# Patient Record
Sex: FEMALE | Race: WHITE | NOT HISPANIC OR LATINO | Employment: UNEMPLOYED | URBAN - METROPOLITAN AREA
[De-identification: names, ages, dates, MRNs, and addresses within clinical notes are randomized per-mention and may not be internally consistent; named-entity substitution may affect disease eponyms.]

---

## 2017-12-07 ENCOUNTER — NON-PROVIDER VISIT (OUTPATIENT)
Dept: URGENT CARE | Facility: CLINIC | Age: 27
End: 2017-12-07

## 2017-12-07 DIAGNOSIS — Z11.1 PPD SCREENING TEST: ICD-10-CM

## 2017-12-07 PROCEDURE — 86580 TB INTRADERMAL TEST: CPT | Performed by: FAMILY MEDICINE

## 2017-12-09 ENCOUNTER — NON-PROVIDER VISIT (OUTPATIENT)
Dept: URGENT CARE | Facility: CLINIC | Age: 27
End: 2017-12-09

## 2017-12-09 DIAGNOSIS — Z11.1 ENCOUNTER FOR PPD SKIN TEST READING: ICD-10-CM

## 2017-12-09 LAB — TB WHEAL 3D P 5 TU DIAM: NORMAL MM

## 2017-12-12 ENCOUNTER — EH NON-PROVIDER (OUTPATIENT)
Dept: OCCUPATIONAL MEDICINE | Facility: CLINIC | Age: 27
End: 2017-12-12

## 2017-12-12 ENCOUNTER — EMPLOYEE HEALTH (OUTPATIENT)
Dept: OCCUPATIONAL MEDICINE | Facility: CLINIC | Age: 27
End: 2017-12-12

## 2017-12-12 ENCOUNTER — HOSPITAL ENCOUNTER (OUTPATIENT)
Facility: MEDICAL CENTER | Age: 27
End: 2017-12-12
Attending: PREVENTIVE MEDICINE
Payer: COMMERCIAL

## 2017-12-12 DIAGNOSIS — Z02.1 PRE-EMPLOYMENT DRUG SCREENING: ICD-10-CM

## 2017-12-12 DIAGNOSIS — Z02.1 ENCOUNTER FOR PRE-EMPLOYMENT HEALTH SCREENING EXAMINATION: ICD-10-CM

## 2017-12-12 LAB
AMP AMPHETAMINE: NORMAL
BAR BARBITURATES: NORMAL
BZO BENZODIAZEPINES: NORMAL
COC COCAINE: NORMAL
INT CON NEG: NORMAL
INT CON POS: NORMAL
MDMA ECSTASY: NORMAL
MET METHAMPHETAMINES: NORMAL
MTD METHADONE: NORMAL
OPI OPIATES: NORMAL
OXY OXYCODONE: NORMAL
PCP PHENCYCLIDINE: NORMAL
POC URINE DRUG SCREEN OCDRS: NORMAL
THC: NORMAL

## 2017-12-12 PROCEDURE — 90715 TDAP VACCINE 7 YRS/> IM: CPT | Performed by: PREVENTIVE MEDICINE

## 2017-12-12 PROCEDURE — 80305 DRUG TEST PRSMV DIR OPT OBS: CPT | Performed by: PREVENTIVE MEDICINE

## 2017-12-12 PROCEDURE — 8915 PR COMPREHENSIVE PHYSICAL: Performed by: PREVENTIVE MEDICINE

## 2017-12-12 PROCEDURE — 86480 TB TEST CELL IMMUN MEASURE: CPT | Performed by: PREVENTIVE MEDICINE

## 2017-12-12 PROCEDURE — 86787 VARICELLA-ZOSTER ANTIBODY: CPT | Performed by: PREVENTIVE MEDICINE

## 2017-12-12 PROCEDURE — 90686 IIV4 VACC NO PRSV 0.5 ML IM: CPT | Performed by: PREVENTIVE MEDICINE

## 2017-12-12 PROCEDURE — 94375 RESPIRATORY FLOW VOLUME LOOP: CPT | Performed by: PREVENTIVE MEDICINE

## 2017-12-13 LAB — VZV IGG SER IA-ACNC: POSITIVE

## 2017-12-15 LAB
M TB TUBERC IFN-G BLD QL: NEGATIVE
M TB TUBERC IFN-G/MITOGEN IGNF BLD: -0.01
M TB TUBERC IGNF/MITOGEN IGNF CONTROL: 41.81 [IU]/ML
MITOGEN IGNF BCKGRD COR BLD-ACNC: 0.07 [IU]/ML

## 2018-01-26 ENCOUNTER — HOSPITAL ENCOUNTER (EMERGENCY)
Facility: MEDICAL CENTER | Age: 28
End: 2018-01-27
Attending: EMERGENCY MEDICINE

## 2018-01-26 VITALS
DIASTOLIC BLOOD PRESSURE: 75 MMHG | HEART RATE: 105 BPM | HEIGHT: 65 IN | BODY MASS INDEX: 20.68 KG/M2 | SYSTOLIC BLOOD PRESSURE: 102 MMHG | WEIGHT: 124.12 LBS | RESPIRATION RATE: 18 BRPM | TEMPERATURE: 99 F

## 2018-01-26 DIAGNOSIS — S61.112A LACERATION OF LEFT THUMB WITHOUT FOREIGN BODY WITH DAMAGE TO NAIL, INITIAL ENCOUNTER: ICD-10-CM

## 2018-01-26 PROCEDURE — 99283 EMERGENCY DEPT VISIT LOW MDM: CPT

## 2018-01-27 PROCEDURE — 99283 EMERGENCY DEPT VISIT LOW MDM: CPT

## 2018-01-27 PROCEDURE — 11730 AVULSION NAIL PLATE SIMPLE 1: CPT

## 2018-01-27 PROCEDURE — 303484 HCHG DRESSING LARGE

## 2018-01-27 ASSESSMENT — PAIN SCALES - GENERAL: PAINLEVEL_OUTOF10: 5

## 2018-01-27 NOTE — DISCHARGE INSTRUCTIONS
Fingertip Injuries and Amputations  Fingertip injuries are common and often get injured because they are last to escape when pulling your hand out of harm's way. You have amputated (cut off) part of your finger. How this turns out depends largely on how much was amputated. If just the tip is amputated, often the end of the finger will grow back and the finger may return to much the same as it was before the injury.   If more of the finger is missing, your caregiver has done the best with the tissue remaining to allow you to keep as much finger as is possible. Your caregiver after checking your injury has tried to leave you with a painless fingertip that has durable, feeling skin. If possible, your caregiver has tried to maintain the finger's length and appearance and preserve its fingernail.   Please read the instructions outlined below and refer to this sheet in the next few weeks. These instructions provide you with general information on caring for yourself. Your caregiver may also give you specific instructions. While your treatment has been done according to the most current medical practices available, unavoidable complications occasionally occur. If you have any problems or questions after discharge, please call your caregiver.  HOME CARE INSTRUCTIONS   · You may resume normal diet and activities as directed or allowed.  · Keep your hand elevated above the level of your heart. This helps decrease pain and swelling.  · Keep ice packs (or a bag of ice wrapped in a towel) on the injured area for 15-20 minutes, 3-4 times per day, for the first two days.  · Change dressings if necessary or as directed.  · Clean the wound daily or as directed.  · Only take over-the-counter or prescription medicines for pain, discomfort, or fever as directed by your caregiver.  · Keep appointments as directed.  SEEK IMMEDIATE MEDICAL CARE IF:  · You develop redness, swelling, numbness or increasing pain in the wound.  · There is pus  coming from the wound.  · You develop an unexplained oral temperature above 102° F (38.9° C) or as your caregiver suggests.  · There is a foul (bad) smell coming from the wound or dressing.  · There is a breaking open of the wound (edges not staying together) after sutures or staples have been removed.  MAKE SURE YOU:   · Understand these instructions.  · Will watch your condition.  · Will get help right away if you are not doing well or get worse.  Document Released: 11/08/2006 Document Revised: 03/11/2013 Document Reviewed: 10/07/2009  ExitCare® Patient Information ©2014 [employer].      Laceration Care, Adult  A laceration is a cut that goes through all of the layers of the skin and into the tissue that is right under the skin. Some lacerations heal on their own. Others need to be closed with stitches (sutures), staples, skin adhesive strips, or skin glue. Proper laceration care minimizes the risk of infection and helps the laceration to heal better.  HOW TO CARE FOR YOUR LACERATION  If sutures or staples were used:  · Keep the wound clean and dry.  · If you were given a bandage (dressing), you should change it at least one time per day or as told by your health care provider. You should also change it if it becomes wet or dirty.  · Keep the wound completely dry for the first 24 hours or as told by your health care provider. After that time, you may shower or bathe. However, make sure that the wound is not soaked in water until after the sutures or staples have been removed.  · Clean the wound one time each day or as told by your health care provider:  ¨ Wash the wound with soap and water.  ¨ Rinse the wound with water to remove all soap.  ¨ Pat the wound dry with a clean towel. Do not rub the wound.  · After cleaning the wound, apply a thin layer of antibiotic ointment as told by your health care provider. This will help to prevent infection and keep the dressing from sticking to the wound.  · Have the  sutures or staples removed as told by your health care provider.  If skin adhesive strips were used:  · Keep the wound clean and dry.  · If you were given a bandage (dressing), you should change it at least one time per day or as told by your health care provider. You should also change it if it becomes dirty or wet.  · Do not get the skin adhesive strips wet. You may shower or bathe, but be careful to keep the wound dry.  · If the wound gets wet, pat it dry with a clean towel. Do not rub the wound.  · Skin adhesive strips fall off on their own. You may trim the strips as the wound heals. Do not remove skin adhesive strips that are still stuck to the wound. They will fall off in time.  If skin glue was used:  · Try to keep the wound dry, but you may briefly wet it in the shower or bath. Do not soak the wound in water, such as by swimming.  · After you have showered or bathed, gently pat the wound dry with a clean towel. Do not rub the wound.  · Do not do any activities that will make you sweat heavily until the skin glue has fallen off on its own.  · Do not apply liquid, cream, or ointment medicine to the wound while the skin glue is in place. Using those may loosen the film before the wound has healed.  · If you were given a bandage (dressing), you should change it at least one time per day or as told by your health care provider. You should also change it if it becomes dirty or wet.  · If a dressing is placed over the wound, be careful not to apply tape directly over the skin glue. Doing that may cause the glue to be pulled off before the wound has healed.  · Do not pick at the glue. The skin glue usually remains in place for 5-10 days, then it falls off of the skin.  General Instructions  · Take over-the-counter and prescription medicines only as told by your health care provider.  · If you were prescribed an antibiotic medicine or ointment, take or apply it as told by your doctor. Do not stop using it even if  your condition improves.  · To help prevent scarring, make sure to cover your wound with sunscreen whenever you are outside after stitches are removed, after adhesive strips are removed, or when glue remains in place and the wound is healed. Make sure to wear a sunscreen of at least 30 SPF.  · Do not scratch or pick at the wound.  · Keep all follow-up visits as told by your health care provider. This is important.  · Check your wound every day for signs of infection. Watch for:  ¨ Redness, swelling, or pain.  ¨ Fluid, blood, or pus.  · Raise (elevate) the injured area above the level of your heart while you are sitting or lying down, if possible.  SEEK MEDICAL CARE IF:  · You received a tetanus shot and you have swelling, severe pain, redness, or bleeding at the injection site.  · You have a fever.  · A wound that was closed breaks open.  · You notice a bad smell coming from your wound or your dressing.  · You notice something coming out of the wound, such as wood or glass.  · Your pain is not controlled with medicine.  · You have increased redness, swelling, or pain at the site of your wound.  · You have fluid, blood, or pus coming from your wound.  · You notice a change in the color of your skin near your wound.  · You need to change the dressing frequently due to fluid, blood, or pus draining from the wound.  · You develop a new rash.  · You develop numbness around the wound.  SEEK IMMEDIATE MEDICAL CARE IF:  · You develop severe swelling around the wound.  · Your pain suddenly increases and is severe.  · You develop painful lumps near the wound or on skin that is anywhere on your body.  · You have a red streak going away from your wound.  · The wound is on your hand or foot and you cannot properly move a finger or toe.  · The wound is on your hand or foot and you notice that your fingers or toes look pale or bluish.     This information is not intended to replace advice given to you by your health care provider.  Make sure you discuss any questions you have with your health care provider.     Document Released: 12/18/2006 Document Revised: 05/03/2016 Document Reviewed: 12/14/2015  Elsevier Interactive Patient Education ©2016 [employer]

## 2018-01-27 NOTE — ED NOTES
0015 Wound dressed  0025 D/C instn reviewed Dsg dry and intact post Pt advised to reinforce dsg only Do not remove x 3 d Then leave surgifoam in place and apply  bandaide D/C ambul with family F/U PMD prn Return if S/S infection Stable improved condn

## 2018-01-27 NOTE — ED PROVIDER NOTES
"ED Provider Note    CHIEF COMPLAINT  Chief Complaint   Patient presents with   • T-5000 Lacerations     LT thumb vs knife at 2200 \" won't stop bleeding \"       HPI  Selin Capone is a 27 y.o. female who presents to the emergency department complaining of finger laceration. Patient says that she was cutting go all the the knife cut the tip of her left thumb. She is right-hand dominant. Tetanus is up-to-date within the last few months. No significant past medical history. Injury happened within the last hour. No other injuries or complaints    REVIEW OF SYSTEMS  See HPI for further details. All other systems are negative.     PAST MEDICAL HISTORY       SOCIAL HISTORY  Social History     Social History Main Topics   • Smoking status: Never Smoker   • Smokeless tobacco: Never Used   • Alcohol use Yes      Comment: occass   • Drug use: No   • Sexual activity: Not on file       SURGICAL HISTORY  patient denies any surgical history    CURRENT MEDICATIONS  Home Medications     Reviewed by Brionna Carrion R.N. (Registered Nurse) on 01/27/18 at 0007  Med List Status: Complete   Medication Last Dose Status        Patient Keanu Taking any Medications                       ALLERGIES  No Known Allergies    PHYSICAL EXAM  VITAL SIGNS: /75   Pulse (!) 105   Temp 37.2 °C (99 °F)   Resp 18   Ht 1.651 m (5' 5\")   Wt 56.3 kg (124 lb 1.9 oz)   BMI 20.65 kg/m²  @[id number]::@  Pulse ox interpretation: I interpret this pulse ox as normal.  Constitutional: Alert in no apparent distress.  HENT: Normocephalic, Atraumatic, Bilateral external ears normal. Nose normal.   Eyes: Pupils are equal and reactive. Conjunctiva normal, non-icteric.   Heart: Regular rate and rythm, no murmurs.    Lungs: Clear to auscultation bilaterally.    Skin: Warm, Dry, No erythema, No rash.  Left thumb: Distal lateral/radial aspect of distal thumb and nailbed with avulsion laceration measuring approximately 1 cm 0.5 cm avulsion. Minimal active venous " bleeding. No suturable wounds to nail bed. Distal neurovascular intact. Full range motion of all joints.    Neurologic: Alert, Grossly non-focal.   Psychiatric: Affect normal, Judgment normal, Mood normal, Appears appropriate and not intoxicated.       Procedure note: Quick clot foam and tube gauze dressing placed with good hemostasis. Patient tolerated well.    COURSE & MEDICAL DECISION MAKING  Pertinent Labs & Imaging studies reviewed. (See chart for details)  History presenting to the return of her avulsion laceration of distal left thumb. History and physical exam as above. Wound management as noted in procedure note. Patient discharged home with outpatient wound care instructions and additional wound care supplies for redressing as needed. She is understanding return precautions if needed.     The patient will return for worsening symptoms and is stable at the time of discharge. The patient verbalizes understanding and will comply.    FINAL IMPRESSION  1. Laceration of left thumb without foreign body with damage to nail, initial encounter               Electronically signed by: Luiz Sheikh, 1/27/2018 12:16 AM

## 2019-03-19 ENCOUNTER — HOSPITAL ENCOUNTER (OUTPATIENT)
Dept: LAB | Facility: MEDICAL CENTER | Age: 29
End: 2019-03-19
Attending: OBSTETRICS & GYNECOLOGY
Payer: COMMERCIAL

## 2019-03-19 PROCEDURE — 87591 N.GONORRHOEAE DNA AMP PROB: CPT

## 2019-03-19 PROCEDURE — 88175 CYTOPATH C/V AUTO FLUID REDO: CPT

## 2019-03-19 PROCEDURE — 87491 CHLMYD TRACH DNA AMP PROBE: CPT

## 2019-03-22 LAB
C TRACH DNA GENITAL QL NAA+PROBE: NEGATIVE
CYTOLOGY REG CYTOL: NORMAL
N GONORRHOEA DNA GENITAL QL NAA+PROBE: NEGATIVE
SPECIMEN SOURCE: NORMAL

## 2019-04-29 ENCOUNTER — HOSPITAL ENCOUNTER (OUTPATIENT)
Dept: LAB | Facility: MEDICAL CENTER | Age: 29
End: 2019-04-29
Attending: OBSTETRICS & GYNECOLOGY
Payer: COMMERCIAL

## 2019-04-29 LAB
ABO GROUP BLD: NORMAL
BASOPHILS # BLD AUTO: 0.6 % (ref 0–1.8)
BASOPHILS # BLD: 0.08 K/UL (ref 0–0.12)
BLD GP AB SCN SERPL QL: NORMAL
EOSINOPHIL # BLD AUTO: 0.19 K/UL (ref 0–0.51)
EOSINOPHIL NFR BLD: 1.3 % (ref 0–6.9)
ERYTHROCYTE [DISTWIDTH] IN BLOOD BY AUTOMATED COUNT: 43.8 FL (ref 35.9–50)
HCT VFR BLD AUTO: 45.9 % (ref 37–47)
HGB BLD-MCNC: 15.2 G/DL (ref 12–16)
IMM GRANULOCYTES # BLD AUTO: 0.06 K/UL (ref 0–0.11)
IMM GRANULOCYTES NFR BLD AUTO: 0.4 % (ref 0–0.9)
LYMPHOCYTES # BLD AUTO: 2.02 K/UL (ref 1–4.8)
LYMPHOCYTES NFR BLD: 14.3 % (ref 22–41)
MCH RBC QN AUTO: 30.9 PG (ref 27–33)
MCHC RBC AUTO-ENTMCNC: 33.1 G/DL (ref 33.6–35)
MCV RBC AUTO: 93.3 FL (ref 81.4–97.8)
MONOCYTES # BLD AUTO: 0.98 K/UL (ref 0–0.85)
MONOCYTES NFR BLD AUTO: 6.9 % (ref 0–13.4)
NEUTROPHILS # BLD AUTO: 10.84 K/UL (ref 2–7.15)
NEUTROPHILS NFR BLD: 76.5 % (ref 44–72)
NRBC # BLD AUTO: 0 K/UL
NRBC BLD-RTO: 0 /100 WBC
PLATELET # BLD AUTO: 204 K/UL (ref 164–446)
PMV BLD AUTO: 11.5 FL (ref 9–12.9)
RBC # BLD AUTO: 4.92 M/UL (ref 4.2–5.4)
RH BLD: NORMAL
WBC # BLD AUTO: 14.2 K/UL (ref 4.8–10.8)

## 2019-04-29 PROCEDURE — 86900 BLOOD TYPING SEROLOGIC ABO: CPT

## 2019-04-29 PROCEDURE — 87086 URINE CULTURE/COLONY COUNT: CPT

## 2019-04-29 PROCEDURE — 86901 BLOOD TYPING SEROLOGIC RH(D): CPT

## 2019-04-29 PROCEDURE — 86803 HEPATITIS C AB TEST: CPT

## 2019-04-29 PROCEDURE — 36415 COLL VENOUS BLD VENIPUNCTURE: CPT

## 2019-04-29 PROCEDURE — 86780 TREPONEMA PALLIDUM: CPT

## 2019-04-29 PROCEDURE — 87389 HIV-1 AG W/HIV-1&-2 AB AG IA: CPT

## 2019-04-29 PROCEDURE — 87340 HEPATITIS B SURFACE AG IA: CPT

## 2019-04-29 PROCEDURE — 81511 FTL CGEN ABNOR FOUR ANAL: CPT

## 2019-04-29 PROCEDURE — 86762 RUBELLA ANTIBODY: CPT

## 2019-04-29 PROCEDURE — 85025 COMPLETE CBC W/AUTO DIFF WBC: CPT

## 2019-04-29 PROCEDURE — 86850 RBC ANTIBODY SCREEN: CPT

## 2019-04-30 LAB
HBV SURFACE AG SER QL: NEGATIVE
HCV AB SER QL: NEGATIVE
HIV 1+2 AB+HIV1 P24 AG SERPL QL IA: NON REACTIVE
RUBV AB SER QL: 32.4 IU/ML
TREPONEMA PALLIDUM IGG+IGM AB [PRESENCE] IN SERUM OR PLASMA BY IMMUNOASSAY: NON REACTIVE

## 2019-05-01 LAB
BACTERIA UR CULT: NORMAL
SIGNIFICANT IND 70042: NORMAL
SITE SITE: NORMAL
SOURCE SOURCE: NORMAL

## 2019-05-02 LAB
# FETUSES US: NORMAL
AFP MOM SERPL: 0.66
AFP SERPL-MCNC: 20 NG/ML
AGE - REPORTED: 29.4 YR
CURRENT SMOKER: NO
FAMILY MEMBER DISEASES HX: NO
GA METHOD: NORMAL
GA: NORMAL WK
HCG MOM SERPL: 1.46
HCG SERPL-ACNC: NORMAL IU/L
HX OF HEREDITARY DISORDERS: NO
IDDM PATIENT QL: NO
INHIBIN A MOM SERPL: 1.09
INHIBIN A SERPL-MCNC: 210 PG/ML
INTEGRATED SCN PATIENT-IMP: NORMAL
PATHOLOGY STUDY: NORMAL
SPECIMEN DRAWN SERPL: NORMAL
U ESTRIOL MOM SERPL: 0.99
U ESTRIOL SERPL-MCNC: 0.69 NG/ML

## 2019-07-10 ENCOUNTER — HOSPITAL ENCOUNTER (OUTPATIENT)
Dept: LAB | Facility: MEDICAL CENTER | Age: 29
End: 2019-07-10
Attending: OBSTETRICS & GYNECOLOGY
Payer: COMMERCIAL

## 2019-07-10 LAB
GLUCOSE 1H P 50 G GLC PO SERPL-MCNC: 164 MG/DL (ref 70–139)
HCT VFR BLD AUTO: 39.6 % (ref 37–47)
HGB BLD-MCNC: 13.5 G/DL (ref 12–16)
PLATELET # BLD AUTO: 186 K/UL (ref 164–446)
TREPONEMA PALLIDUM IGG+IGM AB [PRESENCE] IN SERUM OR PLASMA BY IMMUNOASSAY: NON REACTIVE

## 2019-07-10 PROCEDURE — 85014 HEMATOCRIT: CPT

## 2019-07-10 PROCEDURE — 36415 COLL VENOUS BLD VENIPUNCTURE: CPT

## 2019-07-10 PROCEDURE — 85018 HEMOGLOBIN: CPT

## 2019-07-10 PROCEDURE — 82950 GLUCOSE TEST: CPT

## 2019-07-10 PROCEDURE — 86780 TREPONEMA PALLIDUM: CPT

## 2019-07-10 PROCEDURE — 85049 AUTOMATED PLATELET COUNT: CPT

## 2019-07-25 ENCOUNTER — HOSPITAL ENCOUNTER (OUTPATIENT)
Dept: LAB | Facility: MEDICAL CENTER | Age: 29
End: 2019-07-25
Attending: OBSTETRICS & GYNECOLOGY
Payer: COMMERCIAL

## 2019-07-25 LAB
GLUCOSE 1H P CHAL SERPL-MCNC: 213 MG/DL (ref 65–180)
GLUCOSE 2H P CHAL SERPL-MCNC: 151 MG/DL (ref 65–155)
GLUCOSE 3H P CHAL SERPL-MCNC: 120 MG/DL (ref 65–140)
GLUCOSE BS SERPL-MCNC: 105 MG/DL (ref 65–95)

## 2019-07-25 PROCEDURE — 82952 GTT-ADDED SAMPLES: CPT

## 2019-07-25 PROCEDURE — 82951 GLUCOSE TOLERANCE TEST (GTT): CPT

## 2019-07-25 PROCEDURE — 36415 COLL VENOUS BLD VENIPUNCTURE: CPT

## 2019-08-07 ENCOUNTER — HOSPITAL ENCOUNTER (OUTPATIENT)
Dept: LAB | Facility: MEDICAL CENTER | Age: 29
End: 2019-08-07
Attending: OBSTETRICS & GYNECOLOGY
Payer: COMMERCIAL

## 2019-08-07 ENCOUNTER — OFFICE VISIT (OUTPATIENT)
Dept: URGENT CARE | Facility: CLINIC | Age: 29
End: 2019-08-07
Payer: COMMERCIAL

## 2019-08-07 VITALS
WEIGHT: 161.2 LBS | OXYGEN SATURATION: 98 % | BODY MASS INDEX: 26.86 KG/M2 | HEART RATE: 124 BPM | HEIGHT: 65 IN | DIASTOLIC BLOOD PRESSURE: 74 MMHG | RESPIRATION RATE: 12 BRPM | TEMPERATURE: 98.9 F | SYSTOLIC BLOOD PRESSURE: 132 MMHG

## 2019-08-07 DIAGNOSIS — R42 LIGHTHEADEDNESS: ICD-10-CM

## 2019-08-07 DIAGNOSIS — Z71.1 WORRIED WELL: ICD-10-CM

## 2019-08-07 DIAGNOSIS — R06.02 SOB (SHORTNESS OF BREATH): ICD-10-CM

## 2019-08-07 DIAGNOSIS — Z3A.29 29 WEEKS GESTATION OF PREGNANCY: ICD-10-CM

## 2019-08-07 LAB
GLUCOSE 1H P CHAL SERPL-MCNC: 220 MG/DL (ref 65–180)
GLUCOSE 2H P CHAL SERPL-MCNC: 172 MG/DL (ref 65–155)
GLUCOSE 3H P CHAL SERPL-MCNC: 139 MG/DL (ref 65–140)
GLUCOSE BS SERPL-MCNC: 114 MG/DL (ref 65–95)

## 2019-08-07 PROCEDURE — 36415 COLL VENOUS BLD VENIPUNCTURE: CPT

## 2019-08-07 PROCEDURE — 82952 GTT-ADDED SAMPLES: CPT

## 2019-08-07 PROCEDURE — 82951 GLUCOSE TOLERANCE TEST (GTT): CPT

## 2019-08-07 PROCEDURE — 99203 OFFICE O/P NEW LOW 30 MIN: CPT | Performed by: NURSE PRACTITIONER

## 2019-08-08 NOTE — PROGRESS NOTES
"Subjective:      Narendra Capone is a 29 y.o. female who presents with Lightheadedness (x 1 day.  Pt. is feeling lightheaded, S.O.B., and palpitations. Pt. is 29 weeks pregnant.)            HPI  Lightheaded, SOB, heart palpitations today. 29 weeks pregnant. Was seen by OB earlier today and states baby is doing well. Did not mention these symptoms to her OB. States these symptoms will come and go throughout her pregnancy. Denies fever, recent illness, n/v/d. Denies CP/pressure. Denies dysuria.    PMH:  has no past medical history on file.  MEDS: No current outpatient medications on file.  ALLERGIES: No Known Allergies  SURGHX: No past surgical history on file.  SOCHX:  reports that she has never smoked. She has never used smokeless tobacco. She reports that she drinks alcohol. She reports that she does not use drugs.  FH: Family history was reviewed, no pertinent findings to report    Review of Systems   Constitutional: Negative for chills, fever and malaise/fatigue.   HENT: Negative for congestion, ear discharge, ear pain, sinus pain and sore throat.    Eyes: Negative for blurred vision, double vision and photophobia.   Respiratory: Positive for shortness of breath. Negative for cough.    Cardiovascular: Positive for palpitations. Negative for chest pain, orthopnea and leg swelling.   Gastrointestinal: Negative for abdominal pain, diarrhea, nausea and vomiting.   Genitourinary: Negative for dysuria, flank pain, frequency, hematuria and urgency.   Musculoskeletal: Negative for myalgias.   Skin: Negative for itching and rash.   Neurological: Positive for dizziness. Negative for tingling, sensory change, focal weakness, loss of consciousness, weakness and headaches.   Psychiatric/Behavioral: The patient is not nervous/anxious.    All other systems reviewed and are negative.         Objective:     /58   Pulse (!) 125   Temp 37.2 °C (98.9 °F) (Temporal)   Resp 12   Ht 1.651 m (5' 5\")   Wt 73.1 kg (161 lb 3.2 " oz)   LMP 01/13/2019   SpO2 98%   BMI 26.83 kg/m²      Physical Exam   Constitutional: She is oriented to person, place, and time. Vital signs are normal. She appears well-developed and well-nourished. She is active and cooperative.  Non-toxic appearance. She does not have a sickly appearance. She does not appear ill. No distress.   HENT:   Head: Normocephalic.   Eyes: Pupils are equal, round, and reactive to light. Conjunctivae and EOM are normal.   Neck: Normal range of motion. Neck supple.   Cardiovascular: Normal rate, regular rhythm, S1 normal, S2 normal and normal heart sounds. Exam reveals no gallop and no friction rub.   No murmur heard.  Pulmonary/Chest: Effort normal and breath sounds normal. No accessory muscle usage or stridor. No tachypnea. No respiratory distress. She has no decreased breath sounds. She has no wheezes. She has no rhonchi. She has no rales.   Abdominal: Soft. Bowel sounds are normal.   Musculoskeletal: Normal range of motion.   Neurological: She is alert and oriented to person, place, and time. She has normal strength. No cranial nerve deficit or sensory deficit. Coordination and gait normal. GCS eye subscore is 4. GCS verbal subscore is 5. GCS motor subscore is 6.   Skin: Skin is warm and dry. She is not diaphoretic.   Psychiatric: She has a normal mood and affect. Her speech is normal and behavior is normal. Judgment and thought content normal. She is not actively hallucinating. Cognition and memory are normal. She is attentive.   Vitals reviewed.              Assessment/Plan:     1. SOB (shortness of breath)      2. 29 weeks gestation of pregnancy      3. Lightheadedness    - EKG - Clinic Performed: , tachycardia  - Orthostatic Blood Pressure; NEG    4. Worried well    Discussed normal raise in HR in 3rd trimester, can experience heart palpitations, lightheadedness as well. Make sure to drink and eat frequently.  If episodes continue or worsen, [facility] or [facility], patient understands this.

## 2019-08-09 ASSESSMENT — ENCOUNTER SYMPTOMS
SINUS PAIN: 0
FOCAL WEAKNESS: 0
CHILLS: 0
NERVOUS/ANXIOUS: 0
WEAKNESS: 0
PHOTOPHOBIA: 0
COUGH: 0
FLANK PAIN: 0
SHORTNESS OF BREATH: 1
NAUSEA: 0
LOSS OF CONSCIOUSNESS: 0
DOUBLE VISION: 0
SORE THROAT: 0
VOMITING: 0
HEADACHES: 0
BLURRED VISION: 0
MYALGIAS: 0
ABDOMINAL PAIN: 0
SENSORY CHANGE: 0
PALPITATIONS: 1
TINGLING: 0
DIZZINESS: 1
FEVER: 0
DIARRHEA: 0
ORTHOPNEA: 0

## 2019-08-16 ENCOUNTER — NON-PROVIDER VISIT (OUTPATIENT)
Dept: HEALTH INFORMATION MANAGEMENT | Facility: MEDICAL CENTER | Age: 29
End: 2019-08-16
Payer: COMMERCIAL

## 2019-08-16 VITALS — HEIGHT: 65 IN | BODY MASS INDEX: 26.69 KG/M2 | WEIGHT: 160.2 LBS

## 2019-08-16 DIAGNOSIS — O24.419 GESTATIONAL DIABETES MELLITUS (GDM) IN THIRD TRIMESTER, GESTATIONAL DIABETES METHOD OF CONTROL UNSPECIFIED: ICD-10-CM

## 2019-08-16 PROCEDURE — G0109 DIAB MANAGE TRN IND/GROUP: HCPCS | Performed by: INTERNAL MEDICINE

## 2019-08-16 NOTE — LETTER
August 16, 2019                   Re: Narendra Capone     1990         [id number]       Joel Poole M.D.  [address]  [address]  [place] [address]      Dear :Joel Poole M.D.    On 8/16/2019, your patient Narendra Capone, received 2 hours of nutrition [facility] her [facility] diabetes.  Her EDC is Estimated Date of Delivery: 10/20/19.    A 1,600 kcal vegetarian meal plan was written for her to follow and she agrees to eat at the agreed upon times.    Patient/caregiver appeared to understand the content as demonstrated by appropriate questions.     Narendra Capone was encouraged to discuss this further with you.    Hopefully this will help in your management of her care.  If we can be of further assistance, please feel free to call.    Thank you for the referral.    Sincerely,    GDM CLASS  Certified Diabetes Educator

## 2019-08-16 NOTE — PROGRESS NOTES
Narendra came to the Gestational Diabetes program.  She states her father has Type 2 Diabetes.  She denies any problems with this pregnancy.  She brought in an Accuchek Leah meter.  She was able to do a return demonstration without difficulty. She will keep walking and stay well hydrated with water. Her meal plan is scanned into Media and her Doctor Letters with what was taught can be found under the Letters tab.

## 2019-08-16 NOTE — LETTER
August 16, 2019                   Re: Narendra Capone     1990         [id number]       Joel Poole M.D.  [address]  [address]  [place] [address]      Dear :Joel Poole M.D.    On 8/16/2019, your patient Narendra Capone, received 1 hour of nurse training from the [facility] at [facility] for management of her gestational diabetes.  Her  Estimated Date of Delivery: 10/20/19.  We taught the following subjects:    Introduction to gestational diabetes, benefits and responsibilities of patient, physiology of diabetes and the diease process, benefits of blood glucose monitoring and record keeping, medication action and possible side effects, hypoglycemia, sick day management, exercise, stress reduction and travel with diabetes.       Nurse assessment / Education:    Comments:    Edema:no      Weight: 160.2 lb         Complaints:no      Pathophysiology of diabetes in pregnancy    Discuss  potential maternal and fetal complications in pregnancy with diabetes.     Importance of blood glucose monitoring   Proper testing technique using a One Touch Ultra 2 meter.    At 2 pm, the meter read 143, which was 1.25 hours after eating.  Testing: fasting and one hour after meals,  expected ranges and rationale for strict control.     Ketone test today:no       Recognition and treatment of hypoglycemia.     Insulin taught: No  Insulin briefly dicussed at this time.    Should patient require insulin later in pregnancy, she would need further education.     Reviewed fetal kick counts and other tests to determine fetal well-being  Discuss benefits and risks of exercise in pregnancy  Discuss when to call Doctor  Discuss sick day care  Importance of wearing diabetes identification      Patient/caregiver appeared to understand the content as demonstrated by appropriate questions.     Narendra Capone was encouraged to discuss this further with you.    Hopefully this will help in your management of her care.  If we can  be of further assistance, please feel free to call.    Thank you for the referral.    Sincerely,      Sadie Zavala RN CDEGDM CLASS  Certified Diabetes Educator

## 2019-08-16 NOTE — PROGRESS NOTES
A 1,600 kcal vegetarian meal plan was written for Narendra today.  She agrees to follow the meal plan and eat at the agreed upon meal times.  She will contact us with questions.

## 2019-09-10 ENCOUNTER — HOSPITAL ENCOUNTER (OUTPATIENT)
Dept: LAB | Facility: MEDICAL CENTER | Age: 29
End: 2019-09-10
Attending: OBSTETRICS & GYNECOLOGY
Payer: COMMERCIAL

## 2019-09-10 PROCEDURE — 87150 DNA/RNA AMPLIFIED PROBE: CPT

## 2019-09-10 PROCEDURE — 87081 CULTURE SCREEN ONLY: CPT

## 2019-09-11 LAB — GP B STREP DNA SPEC QL NAA+PROBE: POSITIVE

## 2019-10-12 ENCOUNTER — ANESTHESIA (OUTPATIENT)
Dept: OBGYN | Facility: MEDICAL CENTER | Age: 29
End: 2019-10-12
Payer: COMMERCIAL

## 2019-10-12 ENCOUNTER — HOSPITAL ENCOUNTER (INPATIENT)
Facility: MEDICAL CENTER | Age: 29
LOS: 3 days | End: 2019-10-15
Attending: OBSTETRICS & GYNECOLOGY | Admitting: OBSTETRICS & GYNECOLOGY
Payer: COMMERCIAL

## 2019-10-12 ENCOUNTER — ANESTHESIA EVENT (OUTPATIENT)
Dept: OBGYN | Facility: MEDICAL CENTER | Age: 29
End: 2019-10-12
Payer: COMMERCIAL

## 2019-10-12 DIAGNOSIS — G89.18 POSTOPERATIVE PAIN: ICD-10-CM

## 2019-10-12 PROBLEM — O24.415 ORAL HYPOGLYCEMIC CONTROLLED WHITE CLASSIFICATION A2 GESTATIONAL DIABETES MELLITUS (GDM): Status: ACTIVE | Noted: 2019-10-12

## 2019-10-12 LAB
BASOPHILS # BLD AUTO: 0.4 % (ref 0–1.8)
BASOPHILS # BLD: 0.05 K/UL (ref 0–0.12)
EOSINOPHIL # BLD AUTO: 0.1 K/UL (ref 0–0.51)
EOSINOPHIL NFR BLD: 0.8 % (ref 0–6.9)
ERYTHROCYTE [DISTWIDTH] IN BLOOD BY AUTOMATED COUNT: 46 FL (ref 35.9–50)
ERYTHROCYTE [DISTWIDTH] IN BLOOD BY AUTOMATED COUNT: 46.8 FL (ref 35.9–50)
GLUCOSE BLD-MCNC: 84 MG/DL (ref 65–99)
HCT VFR BLD AUTO: 36.5 % (ref 37–47)
HCT VFR BLD AUTO: 38.5 % (ref 37–47)
HGB BLD-MCNC: 11.9 G/DL (ref 12–16)
HGB BLD-MCNC: 12.6 G/DL (ref 12–16)
HOLDING TUBE BB 8507: NORMAL
IMM GRANULOCYTES # BLD AUTO: 0.07 K/UL (ref 0–0.11)
IMM GRANULOCYTES NFR BLD AUTO: 0.6 % (ref 0–0.9)
LYMPHOCYTES # BLD AUTO: 2.61 K/UL (ref 1–4.8)
LYMPHOCYTES NFR BLD: 20.6 % (ref 22–41)
MCH RBC QN AUTO: 28.8 PG (ref 27–33)
MCH RBC QN AUTO: 29.4 PG (ref 27–33)
MCHC RBC AUTO-ENTMCNC: 32.6 G/DL (ref 33.6–35)
MCHC RBC AUTO-ENTMCNC: 32.7 G/DL (ref 33.6–35)
MCV RBC AUTO: 88.1 FL (ref 81.4–97.8)
MCV RBC AUTO: 90.1 FL (ref 81.4–97.8)
MONOCYTES # BLD AUTO: 1.22 K/UL (ref 0–0.85)
MONOCYTES NFR BLD AUTO: 9.6 % (ref 0–13.4)
NEUTROPHILS # BLD AUTO: 8.61 K/UL (ref 2–7.15)
NEUTROPHILS NFR BLD: 68 % (ref 44–72)
NRBC # BLD AUTO: 0 K/UL
NRBC BLD-RTO: 0 /100 WBC
PLATELET # BLD AUTO: 180 K/UL (ref 164–446)
PLATELET # BLD AUTO: 187 K/UL (ref 164–446)
PMV BLD AUTO: 10.2 FL (ref 9–12.9)
PMV BLD AUTO: 10.3 FL (ref 9–12.9)
RBC # BLD AUTO: 4.05 M/UL (ref 4.2–5.4)
RBC # BLD AUTO: 4.37 M/UL (ref 4.2–5.4)
WBC # BLD AUTO: 12.7 K/UL (ref 4.8–10.8)
WBC # BLD AUTO: 14.7 K/UL (ref 4.8–10.8)

## 2019-10-12 PROCEDURE — 700105 HCHG RX REV CODE 258: Performed by: ANESTHESIOLOGY

## 2019-10-12 PROCEDURE — A9270 NON-COVERED ITEM OR SERVICE: HCPCS | Performed by: ANESTHESIOLOGY

## 2019-10-12 PROCEDURE — 306828 HCHG ANES-TIME GENERAL: Performed by: OBSTETRICS & GYNECOLOGY

## 2019-10-12 PROCEDURE — 700111 HCHG RX REV CODE 636 W/ 250 OVERRIDE (IP): Performed by: ANESTHESIOLOGY

## 2019-10-12 PROCEDURE — 304966 HCHG RECOVERY SVSC TIME ADDL 1/2 HR: Performed by: OBSTETRICS & GYNECOLOGY

## 2019-10-12 PROCEDURE — 700105 HCHG RX REV CODE 258: Performed by: OBSTETRICS & GYNECOLOGY

## 2019-10-12 PROCEDURE — 36415 COLL VENOUS BLD VENIPUNCTURE: CPT

## 2019-10-12 PROCEDURE — A6212 FOAM DRG <=16 SQ IN W/BORDER: HCPCS

## 2019-10-12 PROCEDURE — 700111 HCHG RX REV CODE 636 W/ 250 OVERRIDE (IP): Performed by: OBSTETRICS & GYNECOLOGY

## 2019-10-12 PROCEDURE — 700101 HCHG RX REV CODE 250: Performed by: ANESTHESIOLOGY

## 2019-10-12 PROCEDURE — 85027 COMPLETE CBC AUTOMATED: CPT

## 2019-10-12 PROCEDURE — 304964 HCHG RECOVERY ROOM TIME 1HR: Performed by: OBSTETRICS & GYNECOLOGY

## 2019-10-12 PROCEDURE — 306288 HCHG RETRACTOR C SECTION LG

## 2019-10-12 PROCEDURE — 85025 COMPLETE CBC W/AUTO DIFF WBC: CPT

## 2019-10-12 PROCEDURE — 82962 GLUCOSE BLOOD TEST: CPT

## 2019-10-12 PROCEDURE — 59514 CESAREAN DELIVERY ONLY: CPT | Mod: 80 | Performed by: OBSTETRICS & GYNECOLOGY

## 2019-10-12 PROCEDURE — 700102 HCHG RX REV CODE 250 W/ 637 OVERRIDE(OP): Performed by: ANESTHESIOLOGY

## 2019-10-12 PROCEDURE — A9270 NON-COVERED ITEM OR SERVICE: HCPCS | Performed by: OBSTETRICS & GYNECOLOGY

## 2019-10-12 PROCEDURE — 59514 CESAREAN DELIVERY ONLY: CPT

## 2019-10-12 PROCEDURE — 770002 HCHG ROOM/CARE - OB PRIVATE (112)

## 2019-10-12 PROCEDURE — 700111 HCHG RX REV CODE 636 W/ 250 OVERRIDE (IP)

## 2019-10-12 PROCEDURE — 3E0P7VZ INTRODUCTION OF HORMONE INTO FEMALE REPRODUCTIVE, VIA NATURAL OR ARTIFICIAL OPENING: ICD-10-PCS | Performed by: OBSTETRICS & GYNECOLOGY

## 2019-10-12 PROCEDURE — 700102 HCHG RX REV CODE 250 W/ 637 OVERRIDE(OP): Performed by: OBSTETRICS & GYNECOLOGY

## 2019-10-12 PROCEDURE — 4A1HXCZ MONITORING OF PRODUCTS OF CONCEPTION, CARDIAC RATE, EXTERNAL APPROACH: ICD-10-PCS | Performed by: OBSTETRICS & GYNECOLOGY

## 2019-10-12 PROCEDURE — 305385 HCHG SURGICAL SERVICES 1/4 HOUR: Performed by: OBSTETRICS & GYNECOLOGY

## 2019-10-12 RX ORDER — DIPHENHYDRAMINE HYDROCHLORIDE 50 MG/ML
12.5 INJECTION INTRAMUSCULAR; INTRAVENOUS EVERY 6 HOURS PRN
Status: DISCONTINUED | OUTPATIENT
Start: 2019-10-12 | End: 2019-10-13

## 2019-10-12 RX ORDER — SODIUM CHLORIDE, SODIUM LACTATE, POTASSIUM CHLORIDE, CALCIUM CHLORIDE 600; 310; 30; 20 MG/100ML; MG/100ML; MG/100ML; MG/100ML
INJECTION, SOLUTION INTRAVENOUS CONTINUOUS
Status: DISPENSED | OUTPATIENT
Start: 2019-10-12 | End: 2019-10-12

## 2019-10-12 RX ORDER — ONDANSETRON 2 MG/ML
4 INJECTION INTRAMUSCULAR; INTRAVENOUS
Status: CANCELLED | OUTPATIENT
Start: 2019-10-12

## 2019-10-12 RX ORDER — MISOPROSTOL 200 UG/1
800 TABLET ORAL
Status: DISCONTINUED | OUTPATIENT
Start: 2019-10-12 | End: 2019-10-12 | Stop reason: HOSPADM

## 2019-10-12 RX ORDER — ACETAMINOPHEN 500 MG
1000 TABLET ORAL EVERY 6 HOURS
Status: COMPLETED | OUTPATIENT
Start: 2019-10-12 | End: 2019-10-13

## 2019-10-12 RX ORDER — MEPERIDINE HYDROCHLORIDE 25 MG/ML
12.5 INJECTION INTRAMUSCULAR; INTRAVENOUS; SUBCUTANEOUS
Status: CANCELLED | OUTPATIENT
Start: 2019-10-12

## 2019-10-12 RX ORDER — OXYCODONE HYDROCHLORIDE 10 MG/1
10 TABLET ORAL EVERY 4 HOURS PRN
Status: DISCONTINUED | OUTPATIENT
Start: 2019-10-12 | End: 2019-10-13

## 2019-10-12 RX ORDER — HALOPERIDOL 5 MG/ML
1 INJECTION INTRAMUSCULAR
Status: CANCELLED | OUTPATIENT
Start: 2019-10-12

## 2019-10-12 RX ORDER — MORPHINE SULFATE 0.5 MG/ML
INJECTION, SOLUTION EPIDURAL; INTRATHECAL; INTRAVENOUS PRN
Status: DISCONTINUED | OUTPATIENT
Start: 2019-10-12 | End: 2019-10-12 | Stop reason: SURG

## 2019-10-12 RX ORDER — HYDROMORPHONE HYDROCHLORIDE 1 MG/ML
0.1 INJECTION, SOLUTION INTRAMUSCULAR; INTRAVENOUS; SUBCUTANEOUS
Status: CANCELLED | OUTPATIENT
Start: 2019-10-12

## 2019-10-12 RX ORDER — IBUPROFEN 600 MG/1
600 TABLET ORAL EVERY 6 HOURS PRN
Status: CANCELLED | OUTPATIENT
Start: 2019-10-12

## 2019-10-12 RX ORDER — DOCUSATE SODIUM 100 MG/1
100 CAPSULE, LIQUID FILLED ORAL 2 TIMES DAILY PRN
Status: DISCONTINUED | OUTPATIENT
Start: 2019-10-12 | End: 2019-10-15 | Stop reason: HOSPADM

## 2019-10-12 RX ORDER — ALUMINA, MAGNESIA, AND SIMETHICONE 2400; 2400; 240 MG/30ML; MG/30ML; MG/30ML
30 SUSPENSION ORAL EVERY 6 HOURS PRN
Status: DISCONTINUED | OUTPATIENT
Start: 2019-10-12 | End: 2019-10-12 | Stop reason: HOSPADM

## 2019-10-12 RX ORDER — CITRIC ACID/SODIUM CITRATE 334-500MG
30 SOLUTION, ORAL ORAL ONCE
Status: COMPLETED | OUTPATIENT
Start: 2019-10-12 | End: 2019-10-12

## 2019-10-12 RX ORDER — DIPHENHYDRAMINE HYDROCHLORIDE 50 MG/ML
25 INJECTION INTRAMUSCULAR; INTRAVENOUS EVERY 6 HOURS PRN
Status: DISCONTINUED | OUTPATIENT
Start: 2019-10-12 | End: 2019-10-13

## 2019-10-12 RX ORDER — HYDROMORPHONE HYDROCHLORIDE 1 MG/ML
0.2 INJECTION, SOLUTION INTRAMUSCULAR; INTRAVENOUS; SUBCUTANEOUS
Status: CANCELLED | OUTPATIENT
Start: 2019-10-12

## 2019-10-12 RX ORDER — ONDANSETRON 2 MG/ML
4 INJECTION INTRAMUSCULAR; INTRAVENOUS EVERY 6 HOURS PRN
Status: DISCONTINUED | OUTPATIENT
Start: 2019-10-12 | End: 2019-10-13

## 2019-10-12 RX ORDER — SIMETHICONE 80 MG
80 TABLET,CHEWABLE ORAL 4 TIMES DAILY PRN
Status: DISCONTINUED | OUTPATIENT
Start: 2019-10-12 | End: 2019-10-15 | Stop reason: HOSPADM

## 2019-10-12 RX ORDER — OXYCODONE HYDROCHLORIDE AND ACETAMINOPHEN 5; 325 MG/1; MG/1
1 TABLET ORAL
Status: CANCELLED | OUTPATIENT
Start: 2019-10-12

## 2019-10-12 RX ORDER — CITRIC ACID/SODIUM CITRATE 334-500MG
30 SOLUTION, ORAL ORAL EVERY 6 HOURS PRN
Status: DISCONTINUED | OUTPATIENT
Start: 2019-10-12 | End: 2019-10-12 | Stop reason: HOSPADM

## 2019-10-12 RX ORDER — METHYLERGONOVINE MALEATE 0.2 MG/ML
0.2 INJECTION INTRAVENOUS
Status: DISCONTINUED | OUTPATIENT
Start: 2019-10-12 | End: 2019-10-15 | Stop reason: HOSPADM

## 2019-10-12 RX ORDER — HYDRALAZINE HYDROCHLORIDE 20 MG/ML
5 INJECTION INTRAMUSCULAR; INTRAVENOUS
Status: CANCELLED | OUTPATIENT
Start: 2019-10-12

## 2019-10-12 RX ORDER — CEFAZOLIN SODIUM 1 G/3ML
INJECTION, POWDER, FOR SOLUTION INTRAMUSCULAR; INTRAVENOUS PRN
Status: DISCONTINUED | OUTPATIENT
Start: 2019-10-12 | End: 2019-10-12 | Stop reason: SURG

## 2019-10-12 RX ORDER — HYDROMORPHONE HYDROCHLORIDE 1 MG/ML
0.2 INJECTION, SOLUTION INTRAMUSCULAR; INTRAVENOUS; SUBCUTANEOUS
Status: DISCONTINUED | OUTPATIENT
Start: 2019-10-12 | End: 2019-10-13

## 2019-10-12 RX ORDER — DEXTROSE, SODIUM CHLORIDE, SODIUM LACTATE, POTASSIUM CHLORIDE, AND CALCIUM CHLORIDE 5; .6; .31; .03; .02 G/100ML; G/100ML; G/100ML; G/100ML; G/100ML
INJECTION, SOLUTION INTRAVENOUS CONTINUOUS
Status: DISCONTINUED | OUTPATIENT
Start: 2019-10-12 | End: 2019-10-12

## 2019-10-12 RX ORDER — ACETAMINOPHEN 325 MG/1
325 TABLET ORAL EVERY 4 HOURS PRN
Status: CANCELLED | OUTPATIENT
Start: 2019-10-12

## 2019-10-12 RX ORDER — LABETALOL HYDROCHLORIDE 5 MG/ML
5 INJECTION, SOLUTION INTRAVENOUS
Status: CANCELLED | OUTPATIENT
Start: 2019-10-12

## 2019-10-12 RX ORDER — LORAZEPAM 2 MG/ML
0.5 INJECTION INTRAMUSCULAR
Status: CANCELLED | OUTPATIENT
Start: 2019-10-12

## 2019-10-12 RX ORDER — SODIUM CHLORIDE, SODIUM GLUCONATE, SODIUM ACETATE, POTASSIUM CHLORIDE AND MAGNESIUM CHLORIDE 526; 502; 368; 37; 30 MG/100ML; MG/100ML; MG/100ML; MG/100ML; MG/100ML
1500 INJECTION, SOLUTION INTRAVENOUS ONCE
Status: COMPLETED | OUTPATIENT
Start: 2019-10-12 | End: 2019-10-12

## 2019-10-12 RX ORDER — GLYBURIDE 5 MG/1
5 TABLET ORAL
Status: DISCONTINUED | OUTPATIENT
Start: 2019-10-12 | End: 2019-10-12

## 2019-10-12 RX ORDER — TERBUTALINE SULFATE 1 MG/ML
0.25 INJECTION, SOLUTION SUBCUTANEOUS PRN
Status: DISCONTINUED | OUTPATIENT
Start: 2019-10-12 | End: 2019-10-12

## 2019-10-12 RX ORDER — SODIUM CHLORIDE, SODIUM LACTATE, POTASSIUM CHLORIDE, CALCIUM CHLORIDE 600; 310; 30; 20 MG/100ML; MG/100ML; MG/100ML; MG/100ML
INJECTION, SOLUTION INTRAVENOUS PRN
Status: DISCONTINUED | OUTPATIENT
Start: 2019-10-12 | End: 2019-10-15 | Stop reason: HOSPADM

## 2019-10-12 RX ORDER — MISOPROSTOL 200 UG/1
800 TABLET ORAL
Status: DISCONTINUED | OUTPATIENT
Start: 2019-10-12 | End: 2019-10-15 | Stop reason: HOSPADM

## 2019-10-12 RX ORDER — HYDROCODONE BITARTRATE AND ACETAMINOPHEN 5; 325 MG/1; MG/1
1 TABLET ORAL EVERY 4 HOURS PRN
Status: CANCELLED | OUTPATIENT
Start: 2019-10-12

## 2019-10-12 RX ORDER — OXYCODONE HYDROCHLORIDE AND ACETAMINOPHEN 5; 325 MG/1; MG/1
2 TABLET ORAL
Status: CANCELLED | OUTPATIENT
Start: 2019-10-12

## 2019-10-12 RX ORDER — HYDROCODONE BITARTRATE AND ACETAMINOPHEN 10; 325 MG/1; MG/1
1 TABLET ORAL EVERY 4 HOURS PRN
Status: CANCELLED | OUTPATIENT
Start: 2019-10-12

## 2019-10-12 RX ORDER — METOCLOPRAMIDE HYDROCHLORIDE 5 MG/ML
10 INJECTION INTRAMUSCULAR; INTRAVENOUS ONCE
Status: COMPLETED | OUTPATIENT
Start: 2019-10-12 | End: 2019-10-12

## 2019-10-12 RX ORDER — DIPHENHYDRAMINE HYDROCHLORIDE 50 MG/ML
12.5 INJECTION INTRAMUSCULAR; INTRAVENOUS
Status: CANCELLED | OUTPATIENT
Start: 2019-10-12

## 2019-10-12 RX ORDER — ONDANSETRON 2 MG/ML
4 INJECTION INTRAMUSCULAR; INTRAVENOUS EVERY 6 HOURS PRN
Status: CANCELLED | OUTPATIENT
Start: 2019-10-12

## 2019-10-12 RX ORDER — ONDANSETRON 4 MG/1
4 TABLET, ORALLY DISINTEGRATING ORAL EVERY 6 HOURS PRN
Status: CANCELLED | OUTPATIENT
Start: 2019-10-12

## 2019-10-12 RX ORDER — VITAMIN A ACETATE, BETA CAROTENE, ASCORBIC ACID, CHOLECALCIFEROL, .ALPHA.-TOCOPHEROL ACETATE, DL-, THIAMINE MONONITRATE, RIBOFLAVIN, NIACINAMIDE, PYRIDOXINE HYDROCHLORIDE, FOLIC ACID, CYANOCOBALAMIN, CALCIUM CARBONATE, FERROUS FUMARATE, ZINC OXIDE, CUPRIC OXIDE 3080; 12; 120; 400; 1; 1.84; 3; 20; 22; 920; 25; 200; 27; 10; 2 [IU]/1; UG/1; MG/1; [IU]/1; MG/1; MG/1; MG/1; MG/1; MG/1; [IU]/1; MG/1; MG/1; MG/1; MG/1; MG/1
1 TABLET, FILM COATED ORAL EVERY MORNING
Status: DISCONTINUED | OUTPATIENT
Start: 2019-10-12 | End: 2019-10-15 | Stop reason: HOSPADM

## 2019-10-12 RX ORDER — HYDROMORPHONE HYDROCHLORIDE 1 MG/ML
0.4 INJECTION, SOLUTION INTRAMUSCULAR; INTRAVENOUS; SUBCUTANEOUS
Status: CANCELLED | OUTPATIENT
Start: 2019-10-12

## 2019-10-12 RX ORDER — METHYLERGONOVINE MALEATE 0.2 MG/ML
0.2 INJECTION INTRAVENOUS
Status: DISCONTINUED | OUTPATIENT
Start: 2019-10-12 | End: 2019-10-12 | Stop reason: HOSPADM

## 2019-10-12 RX ORDER — OXYCODONE HYDROCHLORIDE 5 MG/1
5 TABLET ORAL EVERY 4 HOURS PRN
Status: DISCONTINUED | OUTPATIENT
Start: 2019-10-12 | End: 2019-10-13

## 2019-10-12 RX ORDER — SODIUM CHLORIDE, SODIUM GLUCONATE, SODIUM ACETATE, POTASSIUM CHLORIDE AND MAGNESIUM CHLORIDE 526; 502; 368; 37; 30 MG/100ML; MG/100ML; MG/100ML; MG/100ML; MG/100ML
INJECTION, SOLUTION INTRAVENOUS
Status: DISCONTINUED | OUTPATIENT
Start: 2019-10-12 | End: 2019-10-12 | Stop reason: SURG

## 2019-10-12 RX ORDER — BUPIVACAINE HYDROCHLORIDE 7.5 MG/ML
INJECTION, SOLUTION INTRASPINAL PRN
Status: DISCONTINUED | OUTPATIENT
Start: 2019-10-12 | End: 2019-10-12 | Stop reason: SURG

## 2019-10-12 RX ORDER — KETOROLAC TROMETHAMINE 30 MG/ML
30 INJECTION, SOLUTION INTRAMUSCULAR; INTRAVENOUS EVERY 6 HOURS
Status: COMPLETED | OUTPATIENT
Start: 2019-10-12 | End: 2019-10-13

## 2019-10-12 RX ORDER — HYDROMORPHONE HYDROCHLORIDE 1 MG/ML
0.4 INJECTION, SOLUTION INTRAMUSCULAR; INTRAVENOUS; SUBCUTANEOUS
Status: DISCONTINUED | OUTPATIENT
Start: 2019-10-12 | End: 2019-10-13

## 2019-10-12 RX ADMIN — BUPIVACAINE HYDROCHLORIDE IN DEXTROSE 1.5 ML: 7.5 INJECTION, SOLUTION SUBARACHNOID at 06:35

## 2019-10-12 RX ADMIN — OXYTOCIN 1000 ML: 10 INJECTION, SOLUTION INTRAMUSCULAR; INTRAVENOUS at 06:58

## 2019-10-12 RX ADMIN — ACETAMINOPHEN 1000 MG: 500 TABLET ORAL at 21:56

## 2019-10-12 RX ADMIN — AMPICILLIN SODIUM 2 G: 2 INJECTION, POWDER, FOR SOLUTION INTRAMUSCULAR; INTRAVENOUS at 02:58

## 2019-10-12 RX ADMIN — SODIUM CITRATE AND CITRIC ACID MONOHYDRATE 30 ML: 500; 334 SOLUTION ORAL at 06:03

## 2019-10-12 RX ADMIN — MISOPROSTOL 25 MCG: 100 TABLET ORAL at 03:00

## 2019-10-12 RX ADMIN — SODIUM CHLORIDE, SODIUM GLUCONATE, SODIUM ACETATE, POTASSIUM CHLORIDE AND MAGNESIUM CHLORIDE 1000 ML: 526; 502; 368; 37; 30 INJECTION, SOLUTION INTRAVENOUS at 06:04

## 2019-10-12 RX ADMIN — METOCLOPRAMIDE 10 MG: 5 INJECTION, SOLUTION INTRAMUSCULAR; INTRAVENOUS at 06:03

## 2019-10-12 RX ADMIN — CEFAZOLIN 2 G: 330 INJECTION, POWDER, FOR SOLUTION INTRAMUSCULAR; INTRAVENOUS at 06:37

## 2019-10-12 RX ADMIN — KETOROLAC TROMETHAMINE 30 MG: 30 INJECTION, SOLUTION INTRAMUSCULAR at 12:50

## 2019-10-12 RX ADMIN — FAMOTIDINE 20 MG: 10 INJECTION INTRAVENOUS at 06:03

## 2019-10-12 RX ADMIN — ACETAMINOPHEN 1000 MG: 500 TABLET ORAL at 10:24

## 2019-10-12 RX ADMIN — SODIUM CHLORIDE, SODIUM GLUCONATE, SODIUM ACETATE, POTASSIUM CHLORIDE AND MAGNESIUM CHLORIDE: 526; 502; 368; 37; 30 INJECTION, SOLUTION INTRAVENOUS at 06:31

## 2019-10-12 RX ADMIN — FENTANYL CITRATE 10 MCG: 50 INJECTION, SOLUTION INTRAMUSCULAR; INTRAVENOUS at 06:35

## 2019-10-12 RX ADMIN — KETOROLAC TROMETHAMINE 30 MG: 30 INJECTION, SOLUTION INTRAMUSCULAR at 19:18

## 2019-10-12 RX ADMIN — ACETAMINOPHEN 1000 MG: 500 TABLET ORAL at 15:43

## 2019-10-12 RX ADMIN — Medication 20 UNITS: at 08:19

## 2019-10-12 RX ADMIN — MORPHINE SULFATE 0.1 MG: 0.5 INJECTION, SOLUTION EPIDURAL; INTRATHECAL; INTRAVENOUS at 06:35

## 2019-10-12 ASSESSMENT — EDINBURGH POSTNATAL DEPRESSION SCALE (EPDS)
I HAVE FELT SCARED OR PANICKY FOR NO GOOD REASON: NO, NOT MUCH
I HAVE BLAMED MYSELF UNNECESSARILY WHEN THINGS WENT WRONG: YES, SOME OF THE TIME
I HAVE BEEN SO UNHAPPY THAT I HAVE HAD DIFFICULTY SLEEPING: NOT AT ALL
THE THOUGHT OF HARMING MYSELF HAS OCCURRED TO ME: NEVER
I HAVE LOOKED FORWARD WITH ENJOYMENT TO THINGS: AS MUCH AS I EVER DID
THINGS HAVE BEEN GETTING ON TOP OF ME: YES, SOMETIMES I HAVEN'T BEEN COPING AS WELL AS USUAL
I HAVE BEEN SO UNHAPPY THAT I HAVE BEEN CRYING: ONLY OCCASIONALLY
I HAVE BEEN ANXIOUS OR WORRIED FOR NO GOOD REASON: YES, SOMETIMES
I HAVE BEEN ABLE TO LAUGH AND SEE THE FUNNY SIDE OF THINGS: AS MUCH AS I ALWAYS COULD
I HAVE FELT SAD OR MISERABLE: NOT VERY OFTEN

## 2019-10-12 ASSESSMENT — LIFESTYLE VARIABLES: EVER_SMOKED: NEVER

## 2019-10-12 NOTE — ANESTHESIA PROCEDURE NOTES
Spinal Block  Date/Time: 10/12/2019 6:31 AM  Performed by: Stephani De La O M.D.  Authorized by: Stephani De La O M.D.     Start Time:  10/12/2019 6:31 AM  End Time:  10/12/2019 6:35 AM  Reason for Block: primary anesthetic    patient identified, IV checked, site marked, risks and benefits discussed, surgical consent, monitors and equipment checked, pre-op evaluation and timeout performed    Patient Position:  Sitting  Prep: ChloraPrep, patient draped and sterile technique    Monitoring:  Blood pressure, continuous pulse oximetry and heart rate  Approach:  Midline  Location:  L3-4  Injection Technique:  Single-shot  Skin infiltration:  Lidocaine  Strength:  1%  Dose:  3ml  Needle Type:  Pencan  Needle Gauge:  25 G  CSF flowing pre/post injection:  Yes  Sensory Level:  T4

## 2019-10-12 NOTE — PROGRESS NOTES
Patient up to bathroom with two person standby assistance. Patient denies light headedness, dizziness, or leg numbness/tingling. Patient ambulated with steady gait.

## 2019-10-12 NOTE — OR SURGEON
Immediate Post OP Note    PreOp Diagnosis:     1.  A 38 and 6/7 week gestation.  2.  Gestational diabetes mellitus, controlled with diet and glyburide.  3.  Suspected small for gestational age baby.  4.  Group B streptococcus positive.  5.  Fetal intolerance of labor    PostOp Diagnosis:     same    Procedure(s):   SECTION, PRIMARY - Wound Class: Contaminated    Surgeon(s):  LARRY Harris M.D.    Anesthesiologist/Type of Anesthesia:  Anesthesiologist: Stephani De La O M.D.; Vishal Sinclair M.D./Spinal    Surgical Staff:  Circulator: Joanne Porter R.N.  Relief Circulator: Veronica Arrington R.N.  Relief Scrub: Kellee Wilkins  Scrub Person: Ksenia Tucker  L&D Baby  Nurse: Simon Quintanilla R.N.; Ivette Colmenares R.N.    Specimens removed if any:  none    Estimated Blood Loss: 500 cc    Findings:     Baby:  Female, APGARs 8-9, 2280 grams    Results for TIM WHATLEY GIRL (MRN [id number]) as of 10/12/2019 07:27   10/12/2019 07:00   Cord Bg Ph 7.23   Cord Bg Pco2 58.0   Cord Bg Po2 <15.0   Cord Bg Hco3 24   Cord Bg Base Excess -5   Cord Bg O2 Saturation <10.0   CV Ph 7.30   CV Pco2 44.1   CV Po2 19.1   CV Hco3 21   CV Base Excess -5   CV O2 Saturation 39.5        Complications: none      10/12/2019 7:26 AM Joel Poole M.D.

## 2019-10-12 NOTE — OP REPORT
DATE OF SERVICE:  10/12/2019    OPERATION:  Primary low transverse  section.    SURGEON:  Joel Poole MD    ASSISTANT:  Papi Castelan MD    ANESTHESIOLOGIST:  Stephani De La O MD    ANESTHESIA:  Spinal.    PREOPERATIVE DIAGNOSES:  1.  A 38 and 6/7 week gestation.  2.  Gestational diabetes mellitus.  3.  Suspected small for gestational age baby.  4.  Group B streptococcus positive.  5.  Fetal intolerance of labor.    POSTOPERATIVE DIAGNOSES:  1.  A 38 and 6/7 week gestation, delivered.  2.  Gestational diabetes mellitus.  3.  Confirmed small for gestational age baby.  4.  Group B streptococcus positive.  5.  Fetal intolerance of labor.  6.  Meconium    COMPLICATIONS:  None.    ESTIMATED BLOOD LOSS:  500 mL.    BABY:  Female, one-minute Apgar 8 and five-minute Apgar 9, weight 2280 g.    CORD GASES:  Umbilical artery pH 7.23, pCO2 of 58, base excess -5.  Umbilical   vein pH 7.30, pCO2 44.1, base excess -5.    SPECIMEN SENT TO PATHOLOGY:  Placenta.    INDICATIONS:  The patient is a 29-year-old lady,  1, now para 1.  She   was admitted at 38 and 6/7 weeks for cervical ripening and induction of labor   because of gestational diabetes , as well as a suspected small for gestational   age baby;  She also had a positive antepartum group B strep test.  Her diabetes was very   well controlled with diet and glyburide.  Fetal health testing in the office   had been reassuring.    The patient received a single dose of misoprostol vaginally.  Initial fetal   monitoring was reassuring.  Several hours later, there were several profound   fetal heart rate decelerations, remote from delivery.  The patient was not   even sangeeta strongly, cervix was only 1 cm dilated.  After multiple   significant fetal heart rate decelerations, I recommended abandoning attempts   at pursuing vaginal delivery and instead proceeding with .  I felt   that the chance of the baby tolerating a long labor was nil.  Prior  to going   back to the OR, fetal monitoring was reassuring.    DESCRIPTION OF PROCEDURE:  The patient went to the OR.  Spinal anesthesia was   administered.  She was prepped and draped.  Time-out was done.  I made a small   Pfannenstiel skin incision.  I incised a very thin layer of subcutaneous fat.    I incised the rectus fascia transversely.  I cleared the fascia from the   rectus muscles.  I entered the peritoneum in the midline well away from the   bladder.  The peritoneal incision was enlarged.  An Medardo O retractor was   placed.  I incised the vesicouterine peritoneum transversely.  I cleared the   bladder from the lower uterine segment.  I made a low transverse myometrial   incision.  The membranes were pierced with a hemostat revealing meconium.  The   baby's head was extracted from left occiput transverse position with no   difficulty.  I bulb suctioned the baby's mouth and nares.  There were no   nuchal cords.  The shoulders and body delivered easily.  I wrapped the baby in   a towel in a plastic bag for warmth , and 45 seconds later I doubly clamped and   cut the cord.  A segment of cord was isolated and cord gases were sent.    Gentle cord traction and fundal massage produced the intact placenta and all   attached membranes.  I made sure there were no retained products of   conception.  I closed the myometrium with running interlocking #0 Vicryl.  I   placed a second layer of #0 Vicryl, imbricating the first layer.  I   approximated the anterior cul-de-sac peritoneum with a single figure-of-eight   suture of #0 Vicryl.  Both fallopian tubes and ovaries were inspected.  There   were no adnexal masses.  The Medardo O was removed.  I closed the anterior   parietal peritoneum and the posterior layer of the rectus sheath with running   2-0 Vicryl.  I closed both layers of the rectus abdominis fascia with running   #0 PDS.  I closed the Benito's fascia with running 3-0 Vicryl.  I closed the   skin with Insorb  resorbable subcutaneous staples.  1/2 inch wide Steri-Strips,   and a Mepilex Ag dressing were placed.  Sponge and needle counts were correct.       ____________________________________     MD PERRY MISHRA / KARSTEN    DD:  10/12/2019 07:37:49  DT:  10/12/2019 08:37:16    D#:  [id number]  Job#:  [id number]

## 2019-10-12 NOTE — ANESTHESIA PREPROCEDURE EVALUATION
Relevant Problems   OB   (+) Oral hypoglycemic controlled White classification A2 gestational diabetes mellitus (GDM)       Physical Exam    Airway   Mallampati: I  TM distance: >3 FB  Neck ROM: full       Cardiovascular - normal exam     Dental - normal exam         Pulmonary   Breath sounds clear to auscultation     Abdominal    Neurological - normal exam                 Anesthesia Plan    ASA 2- EMERGENT   ASA physical status emergent criteria: non-reassuring fetal heart tones    Plan - spinal   Neuraxial block will be primary anesthetic              Pertinent diagnostic labs and testing reviewed    Informed Consent:    Anesthetic plan and risks discussed with patient.

## 2019-10-12 NOTE — CARE PLAN

## 2019-10-12 NOTE — H&P
DATE OF ADMISSION:  10/12/2019    CHIEF COMPLAINT:  1.  A 38 and 6/7 week gestation.  2.  Gestational diabetes mellitus.  3.  Suspected small for gestational age baby.  4.  Group B streptococcus positive.    HISTORY OF PRESENT ILLNESS:  This 29-year-old lady is G1, P0.  She has an SOLEDAD   of 10/20/2019 making her EGA 38-6/7 weeks.  She is admitted for planned   induction because of the above diagnoses.  Her gestational diabetes mellitus   has been well controlled with diet, exercise, and glyburide 5 mg twice a day   (at breakfast and every evening).  Her suspected small for gestational age   baby has had normal interval growth on serial ultrasounds.  Most recent scan   at 35 and 5/7th weeks showed estimated fetal weight 2210 g, which was 9th   percentile, with amniotic fluid index of 11 cm.  I anticipate the baby   approximately 2900 g now.  Nonstress test in the office had been persistently   reactive.    PRENATAL CARE:  Blood type is O-positive.  She is immune to rubella.  Quad   screen was reassuring.  She had 2/4 abnormal on her oral glucose tolerance   test.  Ultrasounds have revealed normal fetal anatomy.    PAST MEDICAL HISTORY:  Positive for polycystic ovarian syndrome.    PAST SURGICAL HISTORY:  None.    ALLERGIES:  No known drug allergies.    MEDICATIONS:  1.  Prenatal vitamin daily.  2.  Glyburide 5 mg twice a day.    SOCIAL HISTORY:  She is a radiology technician.  She is .  She denies   alcohol, tobacco or drug consumption.    PHYSICAL EXAMINATION:  VITAL SIGNS:  Temperature 98.0, pulse 88, respirations 16, /81.  HEENT:  Normal.  LUNGS:  Clear to auscultation.  HEART:  Sounds normal.  ABDOMEN:  Nontender.  Fundal height is appropriate.  No hepatosplenomegaly.  EXTREMITIES:  No edema.  Homans negative.  DTRs are normal.  PELVIC:  Cervix 1 cm dilated, 40% effaced with the baby's head at -2 station.    DIAGNOSES:  1.  A 38 and 6/7 week gestation.  2.  Gestational diabetes mellitus, controlled  with diet and glyburide.  3.  Suspected small for gestational age baby.  4.  Group B streptococcus positive.    PLAN:  Misoprostol cervical ripening is in progress, with Pitocin later if   needed.  Ampicillin group B strep prophylaxis.  We will maintain euglycemia   Intrapartum.    ADDSENDUM:  Since H and P dictated, pt has had 2 profound FHR decelerations   and one moderate decel remote from delivery, not even sangeeta strongly.    My bedside U/S showed MERLIN 8.6 cm, no nuchal cord, UA S/D 1.9-3.1.    Discussed pathophysiology of cord compression, discussed the fact that we're   already concerned about the baby's well-being (SGA), I recc. Discontinuing   attempts at pursuing vaginal delivery, pt. Agrees, plan primary .       ____________________________________     MD PERRY MISHRA / KARSTEN    DD:  10/12/2019 04:40:15  DT:  10/12/2019 05:29:02    D#:  [id number]  Job#:  [id number]

## 2019-10-12 NOTE — ANESTHESIA POSTPROCEDURE EVALUATION
Patient: Narendra Capone    Procedure Summary     Date:  10/12/19 Room / Location:  [facility] OR 02 / LABOR AND DELIVERY    Anesthesia Start:  631 Anesthesia Stop:  732    Procedure:   SECTION, PRIMARY (Abdomen) Diagnosis:       Status post primary low transverse  section      (Fetal intolerance to labor)    Surgeon:  Joel Poole M.D. Responsible Provider:  Vishal Sinclair M.D.    Anesthesia Type:  spinal ASA Status:  2 - Emergent          Final Anesthesia Type: spinal  Last vitals  BP   Blood Pressure: 126/81    Temp   36.7 °C (98 °F)    Pulse   Pulse: 88   Resp        SpO2          Anesthesia Post Evaluation    Patient location during evaluation: PACU  Patient participation: complete - patient participated  Level of consciousness: awake and alert    Airway patency: patent  Anesthetic complications: no  Cardiovascular status: hemodynamically stable  Respiratory status: acceptable  Hydration status: euvolemic    PONV: none

## 2019-10-12 NOTE — CONSULTS
Baby girl born this am via C/S to GDM primip mom. Baby had one low blood sugar of 21 and one formula feeding of Similac 10ml. Current blood sugar 45. Attempted hand expression for prolonged period of time, 0 milk obtained. Taught mom hand expression and observed her technique, mom unable to obtain any colostrum as well.      Breasts firm with non-pliable areola. Mom denies any breast surgery. Nipples everted bilaterally but short. Baby making attempts to latch but unable to compress breast for baby to latch.     Baby placed skin to skin with mom and mom encouraged to do lots of skin to skin today. Suggested that if baby not showing improvement in latch by 12 hours, RN will set up breastpump to aid with stimulation. Mom encouraged to continue working on hand expression.     Lactation will continue to assess feedings.

## 2019-10-12 NOTE — PROGRESS NOTES
0221- [facility] 10/20/2019 38.6 weeks presents to L&D for IOL for GDM. TOCO/FM applied. IV started, labs drawn and admit profile completed. Pt having some variables-no UC's. SVE=FT/-3  0300-cytotec placed per orders.  0454-pt had PD x 140 seconds down into the 60's Dr. Poole called and updated on pt.  0512-Dr. Poole at bedside to perform US.   0545-pt continues to have decels. Dr. Poole called . Pt prepped for section.  0628-pt ambulated to OR. See OR charting.  0700-report given to Mai CRAIG

## 2019-10-12 NOTE — PROGRESS NOTES
0650: Assumed care of pt. Pt in surgery for primary c/s.   0658: Viable female per Dr. Poole, baby assigned 8/9 APGARS  0729: Pt to PACU in stable condition  0830: pt and baby transferred to postpartum via gurney, report given to Shyann CRAIG. Pt in stable condition with a firm fundus and light lochia. ID bands verified.

## 2019-10-12 NOTE — ANESTHESIA TIME REPORT
Anesthesia Start and Stop Event Times     Date Time Event    10/12/2019 0608 Ready for Procedure     0631 Anesthesia Start     0732 Anesthesia Stop        Responsible Staff  10/12/19    Name Role Begin End    Stephani De La O M.D. Anesth 0631 0704    Vishal Sinclair M.D. Anesth 0704 0732        Preop Diagnosis (Free Text):  Pre-op Diagnosis     Fetal intolerance to labor        Preop Diagnosis (Codes):  Diagnosis Information     Diagnosis Code(s): Status post primary low transverse  section [Z98.891]        Post op Diagnosis  Fetal intolerance to labor, delivered, current hospitalization  pregnancy    Premium Reason  E. Weekend    Comments:

## 2019-10-12 NOTE — PROGRESS NOTES
After hours of reassuring FHR monitoring, there was a 2-minute decel to 60 BPM, recovered without intervention.  Pt. Is not having any painful contractions.    U/S by me:  Cephalic presentation STAN, no nuchal cord, MERLIN 8.6 cm, UA S/D 1.9-3.1 (FEDF), posterior fundal grade II placenta.    IMP:  Transient cord compression, recovered, no signs of fetal hypoxia or acidosis    PLAN:  Cautiously continue pursuing vaginal delivery, continuous  FHR monitoring.  Discussed pathophysiology of cord compression.  If there are recurrent severe decels remote from delivery, .  Obviously amnioinfusion is not an option (membranes intact).     ADDENDUM:  There was another severe FHR decel remote from delivery, not even in labor yet, recc. abandoning attempt at pursuing vaginal delivery, pt. agrees, preparing for .

## 2019-10-12 NOTE — PROGRESS NOTES
ADMIT H AND P DICTATED    30 yo G1, SOLEDAD 10/20/2019, EGA 38 6/7 weeks    Admitted for induction because suspected SGA baby, as well as   glyburide- and diet-controlled GDM, on 5 mg BID with excellent control.      AP testing:  Office NSTs reactive, U/S at 35 5/7 wks showed EFW 2210g/9th %ile, MERLIN 11 cm, anticipate 2900g now.    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

## 2019-10-13 PROCEDURE — 700112 HCHG RX REV CODE 229: Performed by: OBSTETRICS & GYNECOLOGY

## 2019-10-13 PROCEDURE — 700102 HCHG RX REV CODE 250 W/ 637 OVERRIDE(OP): Performed by: ANESTHESIOLOGY

## 2019-10-13 PROCEDURE — 700111 HCHG RX REV CODE 636 W/ 250 OVERRIDE (IP): Performed by: ANESTHESIOLOGY

## 2019-10-13 PROCEDURE — A9270 NON-COVERED ITEM OR SERVICE: HCPCS | Performed by: ANESTHESIOLOGY

## 2019-10-13 PROCEDURE — 700102 HCHG RX REV CODE 250 W/ 637 OVERRIDE(OP): Performed by: OBSTETRICS & GYNECOLOGY

## 2019-10-13 PROCEDURE — A9270 NON-COVERED ITEM OR SERVICE: HCPCS | Performed by: OBSTETRICS & GYNECOLOGY

## 2019-10-13 PROCEDURE — 770002 HCHG ROOM/CARE - OB PRIVATE (112)

## 2019-10-13 RX ORDER — ACETAMINOPHEN 325 MG/1
325 TABLET ORAL EVERY 4 HOURS PRN
Status: DISCONTINUED | OUTPATIENT
Start: 2019-10-13 | End: 2019-10-15 | Stop reason: HOSPADM

## 2019-10-13 RX ORDER — DIPHENHYDRAMINE HYDROCHLORIDE 50 MG/ML
25 INJECTION INTRAMUSCULAR; INTRAVENOUS EVERY 6 HOURS PRN
Status: DISCONTINUED | OUTPATIENT
Start: 2019-10-13 | End: 2019-10-15 | Stop reason: HOSPADM

## 2019-10-13 RX ORDER — KETOROLAC TROMETHAMINE 30 MG/ML
30 INJECTION, SOLUTION INTRAMUSCULAR; INTRAVENOUS EVERY 6 HOURS
Status: DISCONTINUED | OUTPATIENT
Start: 2019-10-13 | End: 2019-10-13

## 2019-10-13 RX ORDER — OXYCODONE HYDROCHLORIDE 10 MG/1
10 TABLET ORAL EVERY 4 HOURS PRN
Status: DISCONTINUED | OUTPATIENT
Start: 2019-10-13 | End: 2019-10-15 | Stop reason: HOSPADM

## 2019-10-13 RX ORDER — ONDANSETRON 2 MG/ML
4 INJECTION INTRAMUSCULAR; INTRAVENOUS EVERY 6 HOURS PRN
Status: DISCONTINUED | OUTPATIENT
Start: 2019-10-13 | End: 2019-10-15 | Stop reason: HOSPADM

## 2019-10-13 RX ORDER — DIPHENHYDRAMINE HCL 25 MG
25 TABLET ORAL EVERY 6 HOURS PRN
Status: DISCONTINUED | OUTPATIENT
Start: 2019-10-13 | End: 2019-10-15 | Stop reason: HOSPADM

## 2019-10-13 RX ORDER — ONDANSETRON 4 MG/1
4 TABLET, ORALLY DISINTEGRATING ORAL EVERY 6 HOURS PRN
Status: DISCONTINUED | OUTPATIENT
Start: 2019-10-13 | End: 2019-10-15 | Stop reason: HOSPADM

## 2019-10-13 RX ORDER — MORPHINE SULFATE 4 MG/ML
4 INJECTION, SOLUTION INTRAMUSCULAR; INTRAVENOUS
Status: DISCONTINUED | OUTPATIENT
Start: 2019-10-13 | End: 2019-10-15 | Stop reason: HOSPADM

## 2019-10-13 RX ORDER — IBUPROFEN 600 MG/1
600 TABLET ORAL EVERY 6 HOURS PRN
Status: DISCONTINUED | OUTPATIENT
Start: 2019-10-13 | End: 2019-10-15 | Stop reason: HOSPADM

## 2019-10-13 RX ORDER — OXYCODONE HYDROCHLORIDE 5 MG/1
5 TABLET ORAL EVERY 4 HOURS PRN
Status: DISCONTINUED | OUTPATIENT
Start: 2019-10-13 | End: 2019-10-15 | Stop reason: HOSPADM

## 2019-10-13 RX ADMIN — SIMETHICONE CHEW TAB 80 MG 80 MG: 80 TABLET ORAL at 21:58

## 2019-10-13 RX ADMIN — ACETAMINOPHEN 1000 MG: 500 TABLET ORAL at 03:34

## 2019-10-13 RX ADMIN — OXYCODONE HYDROCHLORIDE 5 MG: 5 TABLET ORAL at 20:29

## 2019-10-13 RX ADMIN — SIMETHICONE CHEW TAB 80 MG 80 MG: 80 TABLET ORAL at 00:08

## 2019-10-13 RX ADMIN — IBUPROFEN 600 MG: 600 TABLET ORAL at 19:27

## 2019-10-13 RX ADMIN — KETOROLAC TROMETHAMINE 30 MG: 30 INJECTION, SOLUTION INTRAMUSCULAR at 06:23

## 2019-10-13 RX ADMIN — IBUPROFEN 600 MG: 600 TABLET ORAL at 13:19

## 2019-10-13 RX ADMIN — DOCUSATE SODIUM 100 MG: 100 CAPSULE, LIQUID FILLED ORAL at 15:23

## 2019-10-13 RX ADMIN — KETOROLAC TROMETHAMINE 30 MG: 30 INJECTION, SOLUTION INTRAMUSCULAR at 01:18

## 2019-10-13 RX ADMIN — SIMETHICONE CHEW TAB 80 MG 80 MG: 80 TABLET ORAL at 15:23

## 2019-10-13 NOTE — LACTATION NOTE
This note was copied from a baby's chart.  Infant skin to skin and alert and rooting. MOB Breasts and areolar tissue are very firm making it difficult for infant to latch on. Introduce 24 mm shield with teaching done on use and cleaning. Infant rapidly latched and had intermittent suckling @the breast. Supported the feeding for 30 plus minutes with infant latching on both sides with the shield. Reinforce the benefits of skin to skin and offering the breast every 2-3 hours. Discuss that if infant weight loss is below a certain percentage we will start her pumping.  Encourage to continue to call for assistance as needed. MOB reports understanding.

## 2019-10-13 NOTE — PROGRESS NOTES
POD 1---      Afebrile, VS normal, UO adequate  + flatus, tolerating clear liquids  Well, denies N/V    LAB:      Results for AVISHNAVI WHATLEY (MRN [id number]) as of 10/13/2019 02:46   10/12/2019 03:00 10/12/2019 14:57   WBC 12.7 (H) 14.7 (H)   Hemoglobin 12.6 11.9 (L)   Hematocrit 38.5 36.5 (L)   Platelet Count 187 180       PE:     Lungs clear to auscultation  Abdomen soft, flat, bowel sounds present and normal  Wound dressing in place, waterproof barrier intact, small blood spot outlined (not expanding)  Calves nontender, Frances sign negative bilaterally  Back:  No CVA tenderness     PLAN: Postop care, advance diet as tolerated, increase activity as tolerated.

## 2019-10-13 NOTE — CARE PLAN
Problem: Altered physiologic condition related to postoperative  delivery  Goal: Patient physiologically stable as evidenced by normal lochia, palpable uterine involution and vital signs within normal limits  Outcome: PROGRESSING AS EXPECTED  Note:   Patient VS stable and within defined limits. Fundus firm 1 below U, lochia light rubra at time of assessment.      Problem: Potential for postpartum infection related to surgical incision, compromised uterine condition, urinary tract or respiratory compromise  Goal: Patient will be afebrile and free from signs and symptoms of infection  Outcome: PROGRESSING AS EXPECTED  Note:   Patient is showing no signs or symptoms of infection at time of assessment.

## 2019-10-13 NOTE — PROGRESS NOTES
1000: Patient assessment completed. Discussed pain management plan and patient requests Motrin and simethicone be provided as available. Patient denies dizziness and headaches; states she is voiding w/o difficulty and passing flatus. Pt educated on importance of ambulation and encouraged to ambulate in the hallways 4 x per shift. Pt declined to ambulate at this time and stated she would ambulate later in the shift. Reviewed plan of care, all questions answered, and rounding in place.      1300: Pt re-educated and encouraged to ambulate in the hallways. Pt declined at this time due to visitors in the room.     1600: Pt ambulated in the hallways of the unit and to the NBN and back. Pt denies difficulty and ambulates with a steady gait.

## 2019-10-13 NOTE — LACTATION NOTE
This note was copied from a baby's chart.  Baby 38.6 weeks, baby 30 hours old, . Parents have been supplementing with Similac 10 ml, weight loss 0.09%, supplemental guidelines given with review 10-20-30. Mother lost NS and requests another. NS 24 mm given, mother attempting to latch baby with NS, baby sleepy no latch. LC provided hand expression to both breasts, breasts & areola firm, unable to express drops of colostrum with several minutes of hand expression. Discussed with mother because baby has been getting supplemented with Similac baby may not be interested in breastfeeding, mother requests to pump. Pumping initiated to protect milk supply, pump settings reviewed. Speed 80 decrease to 50-60 after 2 minutes, suction to comfort 8% x 15 minutes every 2-3 hours (after breastfeeding & supplementing baby). Educated parents on cleaning pump parts after each use.     Breastfeeding POC:  1. Breastfeed using NS  2. Supplement according to guideline volumes  3. Pump & hand express   Every 2-3 hours or when baby shows hunger cues.

## 2019-10-13 NOTE — PROGRESS NOTES
Took report from Marissa CRAIG. Assumed patient care. Patient assessed VS stable. Pain reported as 2/10 on pain scale for abdominal and incision discomfort. Breasts soft. Fundus firm 1 below U, lochia light rubra. Legs show no signs of swelling, heat, redness, pain. All questions and concerns answered at this time. Bed rails up x 2. Call light in reach. Patient belongings in reach. Will continue to monitor.

## 2019-10-13 NOTE — LACTATION NOTE
This note was copied from a baby's chart.  Infant is 38 6/7 week gestation infant with history of low blood sugar resolved by glucose gel and formula feeding. MOB breast are very firm with minimal seen when HE. Denies history of breast surgeries. She states infant tries to feed, but it's difficult so she then given formula via a bottle. MOB states she has no milk. Teach that colostrum is drops that she needs and the need to stimulate the breast by attaching infant as showing cues is what build the milk supply to that baby's signals. MOB reports understand. Encourage to call for assistance with latch.

## 2019-10-14 PROCEDURE — 700102 HCHG RX REV CODE 250 W/ 637 OVERRIDE(OP): Performed by: OBSTETRICS & GYNECOLOGY

## 2019-10-14 PROCEDURE — A9270 NON-COVERED ITEM OR SERVICE: HCPCS | Performed by: OBSTETRICS & GYNECOLOGY

## 2019-10-14 PROCEDURE — 770002 HCHG ROOM/CARE - OB PRIVATE (112)

## 2019-10-14 PROCEDURE — 700112 HCHG RX REV CODE 229: Performed by: OBSTETRICS & GYNECOLOGY

## 2019-10-14 RX ADMIN — DOCUSATE SODIUM 100 MG: 100 CAPSULE, LIQUID FILLED ORAL at 19:44

## 2019-10-14 RX ADMIN — SIMETHICONE CHEW TAB 80 MG 80 MG: 80 TABLET ORAL at 12:07

## 2019-10-14 RX ADMIN — OXYCODONE HYDROCHLORIDE 5 MG: 5 TABLET ORAL at 07:08

## 2019-10-14 RX ADMIN — IBUPROFEN 600 MG: 600 TABLET ORAL at 06:01

## 2019-10-14 RX ADMIN — DOCUSATE SODIUM 100 MG: 100 CAPSULE, LIQUID FILLED ORAL at 06:01

## 2019-10-14 RX ADMIN — SIMETHICONE CHEW TAB 80 MG 80 MG: 80 TABLET ORAL at 19:44

## 2019-10-14 RX ADMIN — VITAMIN A, VITAMIN C, VITAMIN D-3, VITAMIN E, VITAMIN B-1, VITAMIN B-2, NIACIN, VITAMIN B-6, CALCIUM, IRON, ZINC, COPPER 1 TABLET: 4000; 120; 400; 22; 1.84; 3; 20; 10; 1; 12; 200; 27; 25; 2 TABLET ORAL at 12:11

## 2019-10-14 RX ADMIN — MAGNESIUM HYDROXIDE 30 ML: 400 SUSPENSION ORAL at 19:48

## 2019-10-14 RX ADMIN — SIMETHICONE CHEW TAB 80 MG 80 MG: 80 TABLET ORAL at 06:01

## 2019-10-14 RX ADMIN — IBUPROFEN 600 MG: 600 TABLET ORAL at 16:52

## 2019-10-14 NOTE — LACTATION NOTE
This note was copied from a baby's chart.  Plan: Spend lots of time with baby on mothers chest-skin to skin. Pump with hospital grade electric breast pump every 2-3 hours/8-10 times per 24 hour period. Practice hand expression. Review [place] breastfeeding web page for video clips on latching, hand expressing breasts, and milk supply.     Attempt to latch baby whenever baby is hungry, shows feeding cues. Mother was provided a nipple shield and reports that she is using it to facilitate latch.     If mother or baby too frustrated or tired to attempt latch,or infant not displaying feeding cues skip latch attempt but still pump, hand express and spend time skin to skin.    Feed baby appropriate amounts of expressed colostrum/milk. Supplement as needed with formula (per parent preference) to ensure infants' caloric needs are met.

## 2019-10-14 NOTE — CARE PLAN
Problem: Altered physiologic condition related to postoperative  delivery  Goal: Patient physiologically stable as evidenced by normal lochia, palpable uterine involution and vital signs within normal limits  Outcome: PROGRESSING AS EXPECTED  Note:   Patient is physiologically stable as evidenced by scant lochia rubra, firm fundus at the umbilicus, and vital signs WDL. Will continue to monitor patient condition.       Problem: Alteration in comfort related to surgical incision and/or after birth pains  Goal: Patient is able to ambulate, care for self and infant with acceptable pain level  Outcome: PROGRESSING AS EXPECTED  Note:   Discussed pain management and verbalization of pain utilizing the 0-10 pain scale. White board updated with times of pain medication availability and pt requests pain medication be provided as available. Discussed non-pharmacological pain control therapies including splinting with a pillow and light abdominal stretching. Pt ambulates with a steady gait and demonstrates the ability to participate in ADLs and provide care for  daughter.

## 2019-10-14 NOTE — PROGRESS NOTES
"Notified by CNA of oral temperature of 100.0 with /93 and . Pt states she was sleeping with multiple blankets, heating pad, and overhead exam light on because room was cold when she fell asleep. Pt requesting medication for incision/abdominal pain. Pt states she has some pressure and pain r/t needing to have a bowel movement, but declines MOM, stating she would prefer to take later in the shift when her guests are gone. Pt states she otherwise \"feels fine.\" Pt medicated for pain with motrin and CNA to recheck temperature in one hour.   "

## 2019-10-14 NOTE — PROGRESS NOTES
"Patient assessment completed. Abdomen round, semi-firm, and tender to palpation. Pt states she feels \"gas pain\" and denies difficulty passing flatus. Pt states she has felt the need to have a bowel movement since yesterday, but is fearful to try due to possible pain. Pt offered MOM and declined at this time, stating she would prefer to take later in the day. Pt offered peppermint tea and encouraged to ambulate to mobilize gas. Pt declined at this time, stating she would like to rest. Pt states she will request MOM when ready and ambulate after resting. Pt requests to be provided pain medication, colace, and simethicone as available. Patient denies dizziness, headaches, and difficulty voiding. Reviewed plan of care, all questions answered, and rounding in place.   "

## 2019-10-14 NOTE — CARE PLAN
Problem: Potential knowledge deficit related to lack of understanding of self and  care  Goal: Patient will verbalize understanding of self and infant care  Outcome: PROGRESSING AS EXPECTED  Note:   Patient asks appropriate questions about pain management for self care. Patient calls and asks questions about  care frequently.      Problem: Potential anxiety related to difficulty adapting to parental role  Goal: Patient will verbalize and demonstrate effective bonding and parenting behavior  Outcome: PROGRESSING AS EXPECTED  Note:   Parents are feeding infant formula and breastfeeding. Parents are keeping infant bundled and tending to her when she cries. Parents hold infant often.

## 2019-10-14 NOTE — PROGRESS NOTES
POD 2---      C/o constipation, has passed flatus.    Afebrile, VS normal, UO adequate  + flatus, tolerating regular diet well, denies N/V    LAB:      Results for VAISHNAVI WHATLEY (MRN [id number]) as of 10/14/2019 07:21   10/12/2019 03:00 10/12/2019 14:57   WBC 12.7 (H) 14.7 (H)   Hemoglobin 12.6 11.9 (L)   Hematocrit 38.5 36.5 (L)   Platelet Count 187 180       PE:     Lungs clear to auscultation  Abdomen soft, flat, bowel sounds present and normal  Wound dressing in place, waterproof barrier intact  Calves nontender, Frances sign negative bilaterally  Back:  No CVA tenderness     PLAN: Milk of Magnesia laxative.  Postop care, analgesia as needed, diet as tolerated,  activity as tolerated. Patient planning on discharge:  tomorrow .

## 2019-10-14 NOTE — PROGRESS NOTES
Took report from Margaret CRAIG. Assumed patient care. Patient assessed VS stable. Pain reported as 4/10 on pain scale. Breasts firm. Fundus firm 1 below U, lochia light rubra. Mepilex dressing intact. Legs show no signs of swelling, heat, redness, pain. All questions and concerns answered at this time. Bed rails up x 2. Call light in reach. Patient belongings in reach. Will continue to monitor.

## 2019-10-14 NOTE — CARE PLAN
Problem: Altered physiologic condition related to postoperative  delivery  Goal: Patient physiologically stable as evidenced by normal lochia, palpable uterine involution and vital signs within normal limits  Outcome: PROGRESSING AS EXPECTED  Note:   Patient is physiologically stable as evidenced by scant lochia rubra, firm fundus one fingerbreadth below the umbilicus, and vital signs WDL. Will continue to monitor patient condition.       Problem: Potential for postpartum infection related to surgical incision, compromised uterine condition, urinary tract or respiratory compromise  Goal: Patient will be afebrile and free from signs and symptoms of infection  Outcome: PROGRESSING AS EXPECTED  Note:   Patient is afebrile and absent for other signs/symptoms of infection. Vital signs WDL.  Will continue to monitor patient condition.

## 2019-10-15 VITALS
TEMPERATURE: 98.9 F | WEIGHT: 167 LBS | HEART RATE: 109 BPM | RESPIRATION RATE: 17 BRPM | BODY MASS INDEX: 28.51 KG/M2 | HEIGHT: 64 IN | SYSTOLIC BLOOD PRESSURE: 126 MMHG | OXYGEN SATURATION: 100 % | DIASTOLIC BLOOD PRESSURE: 82 MMHG

## 2019-10-15 PROCEDURE — 700102 HCHG RX REV CODE 250 W/ 637 OVERRIDE(OP): Performed by: OBSTETRICS & GYNECOLOGY

## 2019-10-15 PROCEDURE — A9270 NON-COVERED ITEM OR SERVICE: HCPCS | Performed by: OBSTETRICS & GYNECOLOGY

## 2019-10-15 RX ORDER — IBUPROFEN 600 MG/1
600 TABLET ORAL EVERY 6 HOURS PRN
Qty: 20 TAB | Refills: 1 | Status: SHIPPED | OUTPATIENT
Start: 2019-10-15

## 2019-10-15 RX ORDER — VITAMIN A ACETATE, BETA CAROTENE, ASCORBIC ACID, CHOLECALCIFEROL, .ALPHA.-TOCOPHEROL ACETATE, DL-, THIAMINE MONONITRATE, RIBOFLAVIN, NIACINAMIDE, PYRIDOXINE HYDROCHLORIDE, FOLIC ACID, CYANOCOBALAMIN, CALCIUM CARBONATE, FERROUS FUMARATE, ZINC OXIDE, CUPRIC OXIDE 3080; 12; 120; 400; 1; 1.84; 3; 20; 22; 920; 25; 200; 27; 10; 2 [IU]/1; UG/1; MG/1; [IU]/1; MG/1; MG/1; MG/1; MG/1; MG/1; [IU]/1; MG/1; MG/1; MG/1; MG/1; MG/1
1 TABLET, FILM COATED ORAL EVERY MORNING
Qty: 30 TAB
Start: 2019-10-16

## 2019-10-15 RX ORDER — PSEUDOEPHEDRINE HCL 30 MG
100 TABLET ORAL 2 TIMES DAILY PRN
Qty: 60 CAP | COMMUNITY
Start: 2019-10-15

## 2019-10-15 RX ORDER — SIMETHICONE 80 MG
80 TABLET,CHEWABLE ORAL 4 TIMES DAILY PRN
Qty: 30 TAB | Refills: 3 | Status: ON HOLD | COMMUNITY
Start: 2019-10-15 | End: 2022-01-09

## 2019-10-15 RX ORDER — ACETAMINOPHEN 325 MG/1
325-650 TABLET ORAL EVERY 4 HOURS PRN
Qty: 30 TAB | Refills: 0 | Status: ON HOLD | COMMUNITY
Start: 2019-10-15 | End: 2022-01-09

## 2019-10-15 RX ORDER — OXYCODONE HYDROCHLORIDE 5 MG/1
5 TABLET ORAL EVERY 6 HOURS PRN
Qty: 15 TAB | Refills: 0 | Status: SHIPPED | OUTPATIENT
Start: 2019-10-15 | End: 2019-10-19

## 2019-10-15 RX ORDER — BISACODYL 10 MG
10 SUPPOSITORY, RECTAL RECTAL
Status: DISCONTINUED | OUTPATIENT
Start: 2019-10-15 | End: 2019-10-15 | Stop reason: HOSPADM

## 2019-10-15 RX ADMIN — IBUPROFEN 600 MG: 600 TABLET ORAL at 05:23

## 2019-10-15 RX ADMIN — VITAMIN A, VITAMIN C, VITAMIN D-3, VITAMIN E, VITAMIN B-1, VITAMIN B-2, NIACIN, VITAMIN B-6, CALCIUM, IRON, ZINC, COPPER: 4000; 120; 400; 22; 1.84; 3; 20; 10; 1; 12; 200; 27; 25; 2 TABLET ORAL at 08:41

## 2019-10-15 RX ADMIN — IBUPROFEN 600 MG: 600 TABLET ORAL at 14:10

## 2019-10-15 RX ADMIN — MAGNESIUM HYDROXIDE 30 ML: 400 SUSPENSION ORAL at 08:42

## 2019-10-15 NOTE — DISCHARGE INSTRUCTIONS
POSTPARTUM DISCHARGE INSTRUCTIONS FOR MOM    YOB: 1990   Age: 29 y.o.               Admit Date: 10/12/2019     Discharge Date: 10/15/2019  Attending Doctor:  Joel Poole M.D.                  Allergies:  Patient has no known allergies.    Discharged to home by car. Discharged via wheelchair, hospital escort: Yes.  Special equipment needed: Not Applicable  Belongings with: Personal  Be sure to schedule a follow-up appointment with your primary care doctor or any specialists as instructed.     Discharge Plan:   Diet Plan: Discussed  Activity Level: Discussed  Confirmed Follow up Appointment: Patient to Call and Schedule Appointment  Confirmed Symptoms Management: Discussed  Medication Reconciliation Updated: Yes  Influenza Vaccine Indication: Patient Refuses    REASONS TO CALL YOUR OBSTETRICIAN:  1.   Persistent fever or shaking chills (Temperature higher than 100.4)  2.   Heavy bleeding (soaking more than 1 pad per hour); Passing clots  3.   Foul odor from vagina  4.   Mastitis (Breast infection; breast pain, chills, fever, redness)  5.   Urinary pain, burning or frequency  6.   Episiotomy infection  7.   Abdominal incision infection  8.   Severe depression longer than 24 hours    HAND WASHING  · Prior to handling the baby.  · Before breastfeeding or bottle feeding baby.  · After using the bathroom or changing the baby's diaper.    WOUND CARE  Ask your physician for additional care instructions.  In general:    ·  Incision:      · Keep clean and dry.    · Do NOT lift anything heavier than your baby for up to 6 weeks.    · There should not be any opening or pus.      VAGINAL CARE  · Nothing inside vagina for 6 weeks: no sexual intercourse, tampons or douching.  · Bleeding may continue for 2-4 weeks.  Amount may vary.    · Call your physician for heavy bleeding which means soaking more than 1 pad per hour    BIRTH CONTROL  · It is possible to become pregnant at any time after delivery and  "while breastfeeding.  · Plan to discuss a method of birth control with your physician at your follow up visit. visit.    DIET AND ELIMINATION  · Eating more fiber (bran cereal, fruits, and vegetables) and drinking plenty of fluids will help to avoid constipation.  · Urinary frequency after childbirth is normal.    POSTPARTUM BLUES  During the first few days after birth, you may experience a sense of the \"blues\" which may include impatience, irritability or even crying.  These feeling come and go quickly.  However, as many as 1 in 10 women experience emotional symptoms known as postpartum depression.    Postpartum depression:  May start as early as the second or third day after delivery or take several weeks or months to develop.  Symptoms of \"blues\" are present, but are more intense:  Crying spells; loss of appetite; feelings of hopelessness or loss of control; fear of touching the baby; over concern or no concern at all about the baby; little or no concern about your own appearance/caring for yourself; and/or inability to sleep or excessive sleeping.  Contact your physician if you are experiencing any of these symptoms.    Crisis Hotline:  · [place] Crisis Hotline:  5-055-JUCHLOC  Or 1-244.378.5725  · [place] Crisis Hotline:  1-321.101.7412  Or 155-510-8341    PREVENTING SHAKEN BABY:  If you are angry or stressed, PUT THE BABY IN THE CRIB, step away, take some deep breaths, and wait until you are calm to care for the baby.  DO NOT SHAKE THE BABY.  You are not alone, call a supporter for help.    · Crisis Call Center 24/7 crisis line 695-535-8881 or 1-998.577.1569  · You can also text them, text \"ANSWER\" to 908736    QUIT SMOKING/TOBACCO USE:  I understand the use of any tobacco products increases my chance of suffering from future heart disease and could cause other illnesses which may shorten my life. Quitting the use of tobacco products is the single most important thing I can do to improve my health. For further " information on smoking / tobacco cessation call a Toll Free Quit Line at 1-935.776.3863 (*National Cancer [place]) or 1-221.629.4638 (American Lung Association) or you can access the web based program at www.lungusa.org.    · [place] Tobacco Users Help Line:  (303) 770-6655       Toll Free: 1-201.796.6532  · Quit Tobacco Program [facility] (151)980-7682    DEPRESSION / SUICIDE RISK:  As you are discharged from this [facility], it is important to learn how to keep safe from harming yourself.    Recognize the warning signs:  · Abrupt changes in personality, positive or negative- including increase in energy   · Giving away possessions  · Change in eating patterns- significant weight changes-  positive or negative  · Change in sleeping patterns- unable to sleep or sleeping all the time   · Unwillingness or inability to communicate  · Depression  · Unusual sadness, discouragement and loneliness  · Talk of wanting to die  · Neglect of personal appearance   · Rebelliousness- reckless behavior  · Withdrawal from people/activities they love  · Confusion- inability to concentrate     If you or a loved one observes any of these behaviors or has concerns about self-harm, here's what you can do:  · Talk about it- your feelings and reasons for harming yourself  · Remove any means that you might use to hurt yourself (examples: pills, rope, extension cords, firearm)  · Get professional help from the community (Mental Health, Substance Abuse, psychological counseling)  · Do not be alone:Call your Safe Contact- someone whom you trust who will be there for you.  · Call your local CRISIS HOTLINE 377-9840 or 606-486-4050  · Call your local Children's Mobile Crisis Response Team [place] (030) 865-1366 or www.[employer]  · Call the toll free National Suicide Prevention Hotlines   · National Suicide Prevention Lifeline 930-463-TNWQ (2010)  · National Hope Line Network 800-SUICIDE  (390-3575)    DISCHARGE SURVEY:  Thank you for choosing [facility].  We hope we provided you with very good care.  You may be receiving a survey in the mail.  Please fill it out.  Your opinion is valuable to us.    ADDITIONAL EDUCATIONAL MATERIALS GIVEN TO PATIENT:        My signature on this form indicates that:  1.  I have reviewed and understand the above information  2.  My questions regarding this information have been answered to my satisfaction.  3.  I have formulated a plan with my discharge nurse to obtain my prescribed medication for home.

## 2019-10-15 NOTE — LACTATION NOTE
This note was copied from a baby's chart.  Progression to breastfeeding discussed with mother. Outlined the supportive measures that should be in place for now, to include skin to skin and other basic strategies, hand expression and intrinsic factors (smell, touch, sight and visualization).     Recommended [place] video on maximizing milk, a [facility], [place] grade breast pump and a hands-free bra so she can massage during a pumping session.    Using nipple shield for baby. Technique reinforced, potential milk supply concerns discussed. Follow up this week for weight check. Support circles explained.    Hand expressing and pumping reviewed for milk stimulation and complete emptying of breasts.Encouraged parents to wake baby every few hours and stimulate her to provide opportunities to learn, explore and practice techniques.

## 2019-10-15 NOTE — CARE PLAN
Problem: Potential knowledge deficit related to lack of understanding of self and  care  Goal: Patient will demonstrate ability to care for self and infant  Outcome: PROGRESSING AS EXPECTED  Note:   Encouraging pt to have infant room in and perform all care as needed for infant.      Problem: Urinary Elimination:  Goal: Ability to reestablish a normal urinary elimination pattern will improve  Note:   Pt ambulating and voiding freely.

## 2019-10-15 NOTE — CARE PLAN
Problem: Altered physiologic condition related to postoperative  delivery  Goal: Patient physiologically stable as evidenced by normal lochia, palpable uterine involution and vital signs within normal limits  Outcome: PROGRESSING AS EXPECTED  Note:   Lochia light, fundus firm, vital signs WNL     Problem: Alteration in comfort related to surgical incision and/or after birth pains  Goal: Patient verbalizes acceptable pain level  Outcome: PROGRESSING AS EXPECTED  Note:   Patient will request pain medication as needed

## 2019-10-15 NOTE — PROGRESS NOTES
Assessment completed. Fundus firm and light lochia. Abd incision w/ dressing intact. VSS. Patient denies pain at this time. Will call if pain med intervention needed.  Discussed plan to discharge today. All questions answered.

## 2019-10-15 NOTE — PROGRESS NOTES
POD 3---      Afebrile,- -141/82-93, UO adequate  + flatus, tolerating regular diet well, denies N/V  No BM yet, pt. Desires another trial of MOM, then Dulcolax RS if needed.   Only using occasional low-dose opioid.     LAB:      VAISHNAVI WHATLEY (MRN [id number]) as of 10/14/2019 07:21    10/12/2019 03:00 10/12/2019 14:57   WBC 12.7 (H) 14.7 (H)   Hemoglobin 12.6 11.9 (L)   Hematocrit 38.5 36.5 (L)   Platelet Count 187 180          PE:     Lungs clear to auscultation  Abdomen soft, flat, bowel sounds present and normal  Wound dressing in place, waterproof barrier intact  Calves nontender, Frances sign negative bilaterally  Back:  No CVA tenderness     PLAN: Postop care, analgesia as needed, diet as tolerated,  activity as tolerated. Patient planning on discharge:  today .    Prescriptions:   PNV, ibuprofen 600 mg, oxycodone 5 mg #15, MOM PRN, colace BID PRN.  Followup plans:   10 days. .

## 2019-10-15 NOTE — PROGRESS NOTES
Discharge instruction for mom and baby discussed. Prescription given and explained. Emphasized the importance of  screening follow-up test. Questions and concerns have been answered. Infant is in the nursery for car seat challenge.

## 2019-10-25 NOTE — DISCHARGE SUMMARY
DATE OF ADMISSION:  10/12/2019    DATE OF DISCHARGE:  10/15/2019    ADMISSION DIAGNOSES:  1.  A 38-6/7 week gestation.  2.  Gestational diabetes mellitus.  3.  Suspected small for gestational age baby.  4.  Group B streptococcus positive.    DISCHARGE DIAGNOSES:  1.  A 38-6/7 week gestation.  2.  Gestational diabetes mellitus.  3.  Suspected small for gestational age baby.  4.  Group B streptococcus positive.  5.  Fetal intolerance of labor.  6.  Meconium.    PROCEDURES:  1.  Very short attempt at induction of labor.  2.  Primary low transverse  section.    COMPLICATIONS:  None.    BABY:  Female, 1-minute Apgar 8 and 5-minute Apgar 9, weight 2280 g.    CORD GASES:  Umbilical artery pH 7.23, pCO2 of 58, base excess -5.  Umbilical   vein pH 7.30, pCO2 of 44.1, base excess -5.    PRESCRIPTIONS:  1.  Prenatal vitamin daily.  2.  Ibuprofen 600 mg every 6 hours p.r.n. pain.  3.  Oxycodone 5 mg every 6 hours p.r.n. severe pain.    HOSPITAL COURSE:  This 29-year-old lady is now G1, P1.  She was admitted at   38-6/7th weeks for cervical ripening and induction of labor   because of diet- and glyburide-controlled gestational  diabetes mellitus, as well as suspected small for gestational age fetus.  She   also has had a positive antepartum group B strep test.   Fetal health testing in the office had   been persistently reassuring.    After initial fetal heart rate monitoring which was reassuring, the patient   received a single dose of intravaginal misoprostol 25 mcg.  Several hours   later, there were several profound fetal heart rate decelerations, remote from   delivery.  The patient was not even having any painful contractions and her   cervix was only 1 cm dilated.  Thus, we proceeded with primary .    Postpartum, the patient was afebrile.  She had normal to mildly elevated blood   pressures, and did not require antihypertensive therapy.  Her systolics were   126-141 and diastolics 82-93 in the 24 hours  prior to discharge.  She was   afebrile.  She had normal return of bowel function.  She was passing flatus.    She did not have a bowel movement.  She was tolerating a regular diet well.    There was no evidence of wound infection.  Hemoglobin and hematocrit were 11.9   and 36.5 postpartum.       ____________________________________     MD PERRY MISHRA / KARSTEN    DD:  10/24/2019 05:25:26  DT:  10/24/2019 18:40:36    D#:  [id number]  Job#:  [id number]

## 2021-07-12 ENCOUNTER — HOSPITAL ENCOUNTER (OUTPATIENT)
Dept: LAB | Facility: MEDICAL CENTER | Age: 31
End: 2021-07-12
Attending: OBSTETRICS & GYNECOLOGY
Payer: COMMERCIAL

## 2021-07-12 ENCOUNTER — HOSPITAL ENCOUNTER (OUTPATIENT)
Facility: MEDICAL CENTER | Age: 31
End: 2021-07-12
Attending: OBSTETRICS & GYNECOLOGY
Payer: COMMERCIAL

## 2021-07-12 LAB
ABO GROUP BLD: NORMAL
BASOPHILS # BLD AUTO: 0.3 % (ref 0–1.8)
BASOPHILS # BLD: 0.03 K/UL (ref 0–0.12)
BLD GP AB SCN SERPL QL: NORMAL
EOSINOPHIL # BLD AUTO: 0.05 K/UL (ref 0–0.51)
EOSINOPHIL NFR BLD: 0.5 % (ref 0–6.9)
ERYTHROCYTE [DISTWIDTH] IN BLOOD BY AUTOMATED COUNT: 41.9 FL (ref 35.9–50)
HBV SURFACE AG SER QL: NORMAL
HCT VFR BLD AUTO: 42.9 % (ref 37–47)
HCV AB SER QL: NORMAL
HGB BLD-MCNC: 14.8 G/DL (ref 12–16)
HIV 1+2 AB+HIV1 P24 AG SERPL QL IA: NORMAL
IMM GRANULOCYTES # BLD AUTO: 0.04 K/UL (ref 0–0.11)
IMM GRANULOCYTES NFR BLD AUTO: 0.4 % (ref 0–0.9)
LYMPHOCYTES # BLD AUTO: 2.09 K/UL (ref 1–4.8)
LYMPHOCYTES NFR BLD: 19.7 % (ref 22–41)
MCH RBC QN AUTO: 30.8 PG (ref 27–33)
MCHC RBC AUTO-ENTMCNC: 34.5 G/DL (ref 33.6–35)
MCV RBC AUTO: 89.4 FL (ref 81.4–97.8)
MONOCYTES # BLD AUTO: 0.61 K/UL (ref 0–0.85)
MONOCYTES NFR BLD AUTO: 5.8 % (ref 0–13.4)
NEUTROPHILS # BLD AUTO: 7.77 K/UL (ref 2–7.15)
NEUTROPHILS NFR BLD: 73.3 % (ref 44–72)
NRBC # BLD AUTO: 0 K/UL
NRBC BLD-RTO: 0 /100 WBC
PLATELET # BLD AUTO: 196 K/UL (ref 164–446)
PMV BLD AUTO: 11.2 FL (ref 9–12.9)
RBC # BLD AUTO: 4.8 M/UL (ref 4.2–5.4)
RH BLD: NORMAL
RUBV AB SER QL: 352 IU/ML
TREPONEMA PALLIDUM IGG+IGM AB [PRESENCE] IN SERUM OR PLASMA BY IMMUNOASSAY: NORMAL
WBC # BLD AUTO: 10.6 K/UL (ref 4.8–10.8)

## 2021-07-12 PROCEDURE — 86780 TREPONEMA PALLIDUM: CPT

## 2021-07-12 PROCEDURE — 36415 COLL VENOUS BLD VENIPUNCTURE: CPT

## 2021-07-12 PROCEDURE — 86762 RUBELLA ANTIBODY: CPT

## 2021-07-12 PROCEDURE — 86850 RBC ANTIBODY SCREEN: CPT

## 2021-07-12 PROCEDURE — 85025 COMPLETE CBC W/AUTO DIFF WBC: CPT

## 2021-07-12 PROCEDURE — 87591 N.GONORRHOEAE DNA AMP PROB: CPT

## 2021-07-12 PROCEDURE — 86900 BLOOD TYPING SEROLOGIC ABO: CPT

## 2021-07-12 PROCEDURE — 88175 CYTOPATH C/V AUTO FLUID REDO: CPT

## 2021-07-12 PROCEDURE — 87491 CHLMYD TRACH DNA AMP PROBE: CPT

## 2021-07-12 PROCEDURE — 86901 BLOOD TYPING SEROLOGIC RH(D): CPT

## 2021-07-12 PROCEDURE — 86803 HEPATITIS C AB TEST: CPT

## 2021-07-12 PROCEDURE — 87389 HIV-1 AG W/HIV-1&-2 AB AG IA: CPT

## 2021-07-12 PROCEDURE — 87340 HEPATITIS B SURFACE AG IA: CPT

## 2021-11-19 ENCOUNTER — OFFICE VISIT (OUTPATIENT)
Dept: OBGYN | Facility: CLINIC | Age: 31
End: 2021-11-19
Payer: COMMERCIAL

## 2021-11-19 DIAGNOSIS — O92.70 LACTATION PROBLEM: ICD-10-CM

## 2021-11-19 PROCEDURE — 99205 OFFICE O/P NEW HI 60 MIN: CPT | Performed by: NURSE PRACTITIONER

## 2021-11-20 NOTE — PROGRESS NOTES
Summary Difficulty making milk and breastfeeding with the first baby, felt her breasts were plugged and milk now coming out. Delayed start with pumping in the hospital and feared her baby was not getting enough. Formula started. Has Lansinoh pump at home. Planned C/S this time on . Reviewed what to expect in the lactation area, how to get her personal pump before delivery, using the Platinum if discharged pumping and how to change the last experience this time. Providing her breast milk is more important to this mom than breastfeeding. She will practice hand expression before in the shower.    Subjective:     Narendra Capone is a 31 y.o. female here to establish lactation care.     Concerns:   Latch on difficulties , feeling that there is not enough milk , breast refusal  and baby not interested  HPI:   Pertinent  history: c/section planned    Mother does not have a history of advanced maternal age, GDM, hypertension prior to pregnancy, insulin resistance, multiple gestation and thyroid disease. These are common conditions which may interfere with milk supply.    Mother does have PCOS. This is a condition these are conditions that may interfere in milk supply.  Other risk factors History of delayed onset of milk, dense breasts.  PCOS signs: acne, hirsutism, cysts on ovaries. No GDM this time.     Breast changes in pregnancy: Yes  History of breast surgeries: No      FEEDING HISTORY:    Past breastfeeding history:  First baby, mom tried for 1 month.       Breast Pumping:   Type of pump:  Spectra recommended  [place] if needing to pump at discharge    ROS:  Constitutional: No fever, chills. Feeling well  Extremities Swelling: No extremity swelling, breast swelling last time making latch difficult  Gastrointestinal: Negative for nausea and vomiting  Breasts: No soreness of breasts and No soreness of nipples.   Psychiatric: Managing ok  Mental Health: No mention of feeling irritable, agitated,  angry, overwhelmed, apathy, exhaustion nor having sleep changes outside infant feeds/demands or appetite changes     Objective:     General: no acute distress  Neurological:  Alert and oriented x3  Breasts: Symetrical , Soft, Plugged Duct - no evidence and Mastitis  - no S/S Dense, little compressibility  Nipples: intact. Everted, not inverting with expression   Psychiatric:  Normal mood and affect. Her behavior is normal. Judgment and thought content normal   Mental Health:  Did NOT exhibit sadness, crying, feeling overwhelmed, agitation or hypervigilance.     Assessment/Plan & Lactation Counseling:     Diagnosis/Problem  Lactation Problem: Prenatal consultation      Discussed concerns and symptoms as listed above in assessment and guidance summarized below.  Topics reviewed included:  [employer] website for reviewing latch and supply  Go to [email or web address] , site was created by Dr. Yolanda Zepeda, Pediatrician, [facility] and [facility].  Ilss can also be downloaded.  Watch four videos  Click ABOUT: HOW: One 15.25 minute video to review attachment and latch  Click ABC (science): Three videos.   A for attachment is 2.56 minutes,   B for breastmilk production is 1.45 minutes and   C for calories is 6.5minutes    hand expression   Families biggest concern that interferes with milk supply is perceived lack of supply. Beginning hand expression has been shown to reduce this incidence   You are NOT trying to get milk, rather becoming familiar and comfortable with your breasts and expressing milk.   More milk sooner at [email or web address]abolics/, CLICK  hand expression.   May start 37 weeks or after, 3x/day, partner participation encouraged.  •  Herbs and medications for increasing supply and their potential side effects and efficacy. No evidence base exists to support their use    •  Maternal Mental Health: Discussed   •  Milk supply is dependent on glandular tissue  "development, hormonal influences, how many times the baby removes milk and how well the breasts are emptied in a 24 hour period. This is a biological reality that we can NOT work around. If, for any reason, your baby is not latching, or you are not able to nurse, then it is important for you to remove the milk instead by pumping or hand expression.  There's no magic trick, tea, food, drink, cookie or supplement that will increase your milk supply. One  must  effectively remove milk to continue to make and maximize milk. In the early days and weeks that can be 8+ times in 24 hours. For older babies, on average 6-7 + times in 24 hours.    •  Low Milk Supply: Causes  o  High androgens, insulin resitance  o Lack of nipple/breast stimulation (Baby at the breast but not suckling, mostly non nutritive sucking)  o Lack of breast emptying (Baby at the breast but no removing much milk)  •  Feeding guidelines   o Feed your baby every 1.5-3 hours, more often if baby acts hungry.   o Awaken baby for feeding if going over 3 hours in the day.   o Until back to birth weight, ONE four hour at night is acceptable if has had 8 prior feedings in 24 hours.    o Need to get in 8-12 feedings per 24 hours.   •  Pumping guidelines:  o Both breasts using \"Hands-on Pumping\" for 10-15 minutes to stimulate milk production.   • See video at : [email or web address].[email or web address].  o Pumping is effective but can quickly exhaust and overwhelm a new mother  o How long will vary woman to woman, typically 8-15 minutes, or 1-2 minutes after flow slows  o Flange Fit: Freely moving nipple in the tunnel with some movement of the areola.    •  Increasing supply besides Galactogogues and Pumping:   o Warmth  o Relaxation   o Physical, auditory narratives  o Childbirth relaxation Techniques  o Shoulder Massages  o Take a nap    • Sore Nipples Concern: Unfortunately, sore nipples are a common problem of breastfeeding.They can develop " for many reasons including a poor breastfeeding latch, not using a breast pump correctly, or an infection. Then, once you have them, sore nipples can lead to a difficult let-down, a low breast milk supply, or early weaning. So, if possible, you want to try to stop sore nipples before they even start.  • Latch: Ask for help. Lactation is available 7 days per week, your nurses have breastfeeding education. Ask until you are satisfied with an answer. Even if it looks right, if it hurts,  seek an understanding why and solutions for you and your baby.   • Position:Ask for help. Lactation is available 7 days per week, your nurses have breastfeeding education.   • Breastfeed every 2-3 Hours. Your baby needs 8-12 opportunities per 24 hours to learn breastfeeding, that is where every 2-3 hours comes from  • Remove the baby from the breast carefully, ask for a demonstration, they may come off by themselves, you may need to stop the feeding on that time  • Be gentle with your breast pump review the settings with a lactation consultant so the pump does not hurt you  • Pain Persistent pain means something is not right, even if latch looks good, if it hurts it needs investigation. Tenderness may occur but not pain.    • Nipple care:  o May apply breastmilk  o Moist-oily ointment after feeding/pumping, ie Lanolin nipple butter, coconut or olive oil, if desired/needed 2-3 times/day until nipples are healed  o You do not need to wash this off before pumping or feeding the baby  •  Breast Care  o Engorgement and swelling  - Gentle hand expression  • Ibuprofen for inflammation  • Heat to breast before feeding/pumping  • Cold to breast 20 minutes after feeding/pumping  .  Historically:   The breast was not plugged, not letting milk out, there needs to be sufficient stimulation  Feed you baby when you desire, but let the baby to the breast first.    A total of 60 minutes with more than 50% was spent preparing to see the patient,  obtaining and reviewing separately obtained history, performing a medically appropriate examination and evaluation, counseling and educating the family,documenting clinical information in the electronic health record, and care coordination as detailed in the above note.       HEATHER Merida.

## 2021-12-01 ENCOUNTER — HOSPITAL ENCOUNTER (OUTPATIENT)
Dept: LAB | Facility: MEDICAL CENTER | Age: 31
End: 2021-12-01
Attending: OBSTETRICS & GYNECOLOGY
Payer: COMMERCIAL

## 2021-12-01 PROCEDURE — 87081 CULTURE SCREEN ONLY: CPT

## 2021-12-01 PROCEDURE — 87150 DNA/RNA AMPLIFIED PROBE: CPT

## 2021-12-05 LAB — GP B STREP DNA SPEC QL NAA+PROBE: POSITIVE

## 2021-12-23 NOTE — H&P
DATE OF ADMISSION:  2022     CHIEF COMPLAINT:  1.  A 39 and 3/7 weeks' gestation.  2.  Previous  section.  3.  Diet-controlled gestational diabetes mellitus.  4.  Group B streptococcus positive.     HISTORY OF PRESENT ILLNESS:  The patient is a 31-year-old lady,  2,   para 1 with an SOLEDAD of 2022 making her EGA 39 and 3/7th weeks at the time   of the scheduled repeat  section.  The indication for surgery is   previous  section.  Risks and benefits have been discussed.  She   declines the option of being sterilized.      ADDENDUM: Pts. Nasopharyngeal COVID PCR test was positive on 2022, asymptomatic, discussed guidelines for PP care, breastfeeding.     Prenatal care has been remarkable   for presumed gestational diabetes mellitus based on past obstetric history,   with normal glucose levels, both fasting and postprandial, on diet alone.  Her   blood type is O positive.  Her third trimester group B strep results were   positive.  Ultrasounds have revealed normal fetal growth, estimated fetal   weight at 36 weeks was 2380 grams, 12th percentile, so I am anticipating 2900   gram baby at 39 weeks.  All of her fasting sugars have been less than 95 and   almost all of her postprandials less than 140 on diet alone.  Weekly nonstress   tests have been reassuring.  Ultrasounds revealed normal fetal anatomy.  She   declined all optional/genetic screening tests.  She declined aneuploidy   screening, declined maternal serum alpha fetoprotein.  All of her blood   pressures have been normal.  Her total pregnancy weight gain was 36 pounds.     OBSTETRIC HISTORY:  Primary  for fetal intolerance of labor at 38 and   6/7 weeks on 10/2019, 2280 gram baby girl.  She had gestational diabetes,   controlled with diet and glyburide.     PAST MEDICAL HISTORY:  Positive for polycystic ovary syndrome and gestational   diabetes mellitus.     ALLERGIES:  No known drug allergies.      MEDICATIONS:  Prenatal vitamin daily.     PAST SURGICAL HISTORY:  1.  [place] teeth extraction in .  2.  Rhinoplasty in 2018.  3.  Primary  section in 10/2019.     SOCIAL HISTORY:  She is  to [place].  She is employed parttime, is a   mobile ultrasound technician.  She denies alcohol, tobacco or drug   consumption.     FAMILY HISTORY:  Positive for hypertension in her mother.  Adult-onset   diabetes in her father.     PHYSICAL EXAMINATION:   VITAL SIGNS:  /70, afebrile.  Weight 169 pounds.  HEENT:  Normal.  LUNGS:  Clear to auscultation.  HEART:  Sounds normal.  ABDOMEN:  Fundal height is appropriate.  No hepatosplenomegaly, no masses.  EXTREMITIES:  No edema.  Homans negative.  DTRs normal.  PELVIC:  Not indicated.     DIAGNOSES:  1.  A 39 and 3/7th weeks' gestation.  2.  Previous  section.  3.  Diet-controlled gestational diabetes mellitus, presumed.  4.  Group B streptococcus positive.  5.  COVID-positive, asymptomatic     PLAN:  Repeat low transverse  section.    ADDENDUM: Pts. Nasopharyngeal COVID PCR test was positive on 2022, asymptomatic, discussed guidelines for PP care, breastfeeding.     ______________________________  MD PERRY Rangel/RON/CHARMAINE    DD:  2021 15:16  DT:  2021 15:56    Job#:  [id number]

## 2021-12-29 ENCOUNTER — PRE-ADMISSION TESTING (OUTPATIENT)
Dept: ADMISSIONS | Facility: MEDICAL CENTER | Age: 31
End: 2021-12-29
Attending: OBSTETRICS & GYNECOLOGY
Payer: COMMERCIAL

## 2022-01-01 NOTE — PROGRESS NOTES
Pt discharged at approximately 1510 via wheelchair with hospital escort. Infant placed in carseat by parent and checked by RN. PT discharge instructions provided approximately 1130 prescriptions ordered by MD given. Checked armbands. Clamp and cuddles removed. No further questions at this time.    Ampicillin

## 2022-01-04 ENCOUNTER — HOSPITAL ENCOUNTER (OUTPATIENT)
Dept: OBGYN | Facility: MEDICAL CENTER | Age: 32
End: 2022-01-04
Attending: OBSTETRICS & GYNECOLOGY
Payer: COMMERCIAL

## 2022-01-04 PROCEDURE — U0003 INFECTIOUS AGENT DETECTION BY NUCLEIC ACID (DNA OR RNA); SEVERE ACUTE RESPIRATORY SYNDROME CORONAVIRUS 2 (SARS-COV-2) (CORONAVIRUS DISEASE [COVID-19]), AMPLIFIED PROBE TECHNIQUE, MAKING USE OF HIGH THROUGHPUT TECHNOLOGIES AS DESCRIBED BY CMS-2020-01-R: HCPCS

## 2022-01-04 PROCEDURE — U0005 INFEC AGEN DETEC AMPLI PROBE: HCPCS

## 2022-01-05 LAB
SARS-COV-2 RNA RESP QL NAA+PROBE: DETECTED
SPECIMEN SOURCE: ABNORMAL

## 2022-01-06 NOTE — PROGRESS NOTES
Covid positive result received from lab, Dr Poole notified. Pt also notified and instructed to isolate.

## 2022-01-07 ENCOUNTER — HOSPITAL ENCOUNTER (INPATIENT)
Facility: MEDICAL CENTER | Age: 32
LOS: 2 days | End: 2022-01-09
Attending: OBSTETRICS & GYNECOLOGY | Admitting: OBSTETRICS & GYNECOLOGY
Payer: COMMERCIAL

## 2022-01-07 ENCOUNTER — ANESTHESIA (OUTPATIENT)
Dept: OBGYN | Facility: MEDICAL CENTER | Age: 32
End: 2022-01-07
Payer: COMMERCIAL

## 2022-01-07 ENCOUNTER — ANESTHESIA EVENT (OUTPATIENT)
Dept: OBGYN | Facility: MEDICAL CENTER | Age: 32
End: 2022-01-07
Payer: COMMERCIAL

## 2022-01-07 DIAGNOSIS — G89.18 POST-OP PAIN: ICD-10-CM

## 2022-01-07 LAB
BASOPHILS # BLD AUTO: 0.3 % (ref 0–1.8)
BASOPHILS # BLD: 0.02 K/UL (ref 0–0.12)
EOSINOPHIL # BLD AUTO: 0.02 K/UL (ref 0–0.51)
EOSINOPHIL NFR BLD: 0.3 % (ref 0–6.9)
ERYTHROCYTE [DISTWIDTH] IN BLOOD BY AUTOMATED COUNT: 51.3 FL (ref 35.9–50)
ERYTHROCYTE [DISTWIDTH] IN BLOOD BY AUTOMATED COUNT: 51.4 FL (ref 35.9–50)
HCT VFR BLD AUTO: 31.8 % (ref 37–47)
HCT VFR BLD AUTO: 40.7 % (ref 37–47)
HGB BLD-MCNC: 10.3 G/DL (ref 12–16)
HGB BLD-MCNC: 13.3 G/DL (ref 12–16)
HOLDING TUBE BB 8507: NORMAL
IMM GRANULOCYTES # BLD AUTO: 0.03 K/UL (ref 0–0.11)
IMM GRANULOCYTES NFR BLD AUTO: 0.4 % (ref 0–0.9)
LYMPHOCYTES # BLD AUTO: 1.92 K/UL (ref 1–4.8)
LYMPHOCYTES NFR BLD: 27.5 % (ref 22–41)
MCH RBC QN AUTO: 28.3 PG (ref 27–33)
MCH RBC QN AUTO: 28.3 PG (ref 27–33)
MCHC RBC AUTO-ENTMCNC: 32.4 G/DL (ref 33.6–35)
MCHC RBC AUTO-ENTMCNC: 32.7 G/DL (ref 33.6–35)
MCV RBC AUTO: 86.6 FL (ref 81.4–97.8)
MCV RBC AUTO: 87.4 FL (ref 81.4–97.8)
MONOCYTES # BLD AUTO: 0.72 K/UL (ref 0–0.85)
MONOCYTES NFR BLD AUTO: 10.3 % (ref 0–13.4)
NEUTROPHILS # BLD AUTO: 4.26 K/UL (ref 2–7.15)
NEUTROPHILS NFR BLD: 61.2 % (ref 44–72)
NRBC # BLD AUTO: 0 K/UL
NRBC BLD-RTO: 0 /100 WBC
PLATELET # BLD AUTO: 108 K/UL (ref 164–446)
PLATELET # BLD AUTO: 133 K/UL (ref 164–446)
PMV BLD AUTO: 10.7 FL (ref 9–12.9)
PMV BLD AUTO: 11.2 FL (ref 9–12.9)
RBC # BLD AUTO: 3.64 M/UL (ref 4.2–5.4)
RBC # BLD AUTO: 4.7 M/UL (ref 4.2–5.4)
WBC # BLD AUTO: 7 K/UL (ref 4.8–10.8)
WBC # BLD AUTO: 8.4 K/UL (ref 4.8–10.8)

## 2022-01-07 PROCEDURE — 36415 COLL VENOUS BLD VENIPUNCTURE: CPT

## 2022-01-07 PROCEDURE — 700111 HCHG RX REV CODE 636 W/ 250 OVERRIDE (IP): Performed by: ANESTHESIOLOGY

## 2022-01-07 PROCEDURE — 700105 HCHG RX REV CODE 258: Performed by: OBSTETRICS & GYNECOLOGY

## 2022-01-07 PROCEDURE — 160009 HCHG ANES TIME/MIN: Performed by: OBSTETRICS & GYNECOLOGY

## 2022-01-07 PROCEDURE — 770002 HCHG ROOM/CARE - OB PRIVATE (112)

## 2022-01-07 PROCEDURE — 85027 COMPLETE CBC AUTOMATED: CPT

## 2022-01-07 PROCEDURE — 160035 HCHG PACU - 1ST 60 MINS PHASE I: Performed by: OBSTETRICS & GYNECOLOGY

## 2022-01-07 PROCEDURE — 160029 HCHG SURGERY MINUTES - 1ST 30 MINS LEVEL 4: Performed by: OBSTETRICS & GYNECOLOGY

## 2022-01-07 PROCEDURE — 700101 HCHG RX REV CODE 250: Performed by: ANESTHESIOLOGY

## 2022-01-07 PROCEDURE — 85025 COMPLETE CBC W/AUTO DIFF WBC: CPT

## 2022-01-07 PROCEDURE — 160036 HCHG PACU - EA ADDL 30 MINS PHASE I: Performed by: OBSTETRICS & GYNECOLOGY

## 2022-01-07 PROCEDURE — 700111 HCHG RX REV CODE 636 W/ 250 OVERRIDE (IP): Performed by: OBSTETRICS & GYNECOLOGY

## 2022-01-07 PROCEDURE — 160048 HCHG OR STATISTICAL LEVEL 1-5: Performed by: OBSTETRICS & GYNECOLOGY

## 2022-01-07 PROCEDURE — 700102 HCHG RX REV CODE 250 W/ 637 OVERRIDE(OP): Performed by: OBSTETRICS & GYNECOLOGY

## 2022-01-07 PROCEDURE — 160041 HCHG SURGERY MINUTES - EA ADDL 1 MIN LEVEL 4: Performed by: OBSTETRICS & GYNECOLOGY

## 2022-01-07 PROCEDURE — 160002 HCHG RECOVERY MINUTES (STAT): Performed by: OBSTETRICS & GYNECOLOGY

## 2022-01-07 PROCEDURE — A9270 NON-COVERED ITEM OR SERVICE: HCPCS | Performed by: OBSTETRICS & GYNECOLOGY

## 2022-01-07 PROCEDURE — 700105 HCHG RX REV CODE 258: Performed by: ANESTHESIOLOGY

## 2022-01-07 RX ORDER — METOPROLOL TARTRATE 1 MG/ML
1 INJECTION, SOLUTION INTRAVENOUS
Status: DISCONTINUED | OUTPATIENT
Start: 2022-01-07 | End: 2022-01-07 | Stop reason: HOSPADM

## 2022-01-07 RX ORDER — DIPHENHYDRAMINE HYDROCHLORIDE 50 MG/ML
12.5 INJECTION INTRAMUSCULAR; INTRAVENOUS EVERY 6 HOURS PRN
Status: ACTIVE | OUTPATIENT
Start: 2022-01-07 | End: 2022-01-08

## 2022-01-07 RX ORDER — BUPIVACAINE HYDROCHLORIDE 7.5 MG/ML
INJECTION, SOLUTION INTRASPINAL PRN
Status: DISCONTINUED | OUTPATIENT
Start: 2022-01-07 | End: 2022-01-07 | Stop reason: SURG

## 2022-01-07 RX ORDER — VITAMIN A ACETATE, BETA CAROTENE, ASCORBIC ACID, CHOLECALCIFEROL, .ALPHA.-TOCOPHEROL ACETATE, DL-, THIAMINE MONONITRATE, RIBOFLAVIN, NIACINAMIDE, PYRIDOXINE HYDROCHLORIDE, FOLIC ACID, CYANOCOBALAMIN, CALCIUM CARBONATE, FERROUS FUMARATE, ZINC OXIDE, CUPRIC OXIDE 3080; 12; 120; 400; 1; 1.84; 3; 20; 22; 920; 25; 200; 27; 10; 2 [IU]/1; UG/1; MG/1; [IU]/1; MG/1; MG/1; MG/1; MG/1; MG/1; [IU]/1; MG/1; MG/1; MG/1; MG/1; MG/1
1 TABLET, FILM COATED ORAL
Status: DISCONTINUED | OUTPATIENT
Start: 2022-01-07 | End: 2022-01-09 | Stop reason: HOSPADM

## 2022-01-07 RX ORDER — KETOROLAC TROMETHAMINE 30 MG/ML
30 INJECTION, SOLUTION INTRAMUSCULAR; INTRAVENOUS EVERY 6 HOURS
Status: COMPLETED | OUTPATIENT
Start: 2022-01-07 | End: 2022-01-08

## 2022-01-07 RX ORDER — MORPHINE SULFATE 0.5 MG/ML
INJECTION, SOLUTION EPIDURAL; INTRATHECAL; INTRAVENOUS PRN
Status: DISCONTINUED | OUTPATIENT
Start: 2022-01-07 | End: 2022-01-07 | Stop reason: SURG

## 2022-01-07 RX ORDER — HYDROMORPHONE HYDROCHLORIDE 1 MG/ML
0.4 INJECTION, SOLUTION INTRAMUSCULAR; INTRAVENOUS; SUBCUTANEOUS
Status: ACTIVE | OUTPATIENT
Start: 2022-01-07 | End: 2022-01-08

## 2022-01-07 RX ORDER — ACETAMINOPHEN 500 MG
1000 TABLET ORAL EVERY 6 HOURS
Status: DISCONTINUED | OUTPATIENT
Start: 2022-01-07 | End: 2022-01-09 | Stop reason: HOSPADM

## 2022-01-07 RX ORDER — HALOPERIDOL 5 MG/ML
1 INJECTION INTRAMUSCULAR
Status: DISCONTINUED | OUTPATIENT
Start: 2022-01-07 | End: 2022-01-07 | Stop reason: HOSPADM

## 2022-01-07 RX ORDER — KETOROLAC TROMETHAMINE 30 MG/ML
30 INJECTION, SOLUTION INTRAMUSCULAR; INTRAVENOUS EVERY 6 HOURS
Status: DISCONTINUED | OUTPATIENT
Start: 2022-01-08 | End: 2022-01-08

## 2022-01-07 RX ORDER — DIPHENHYDRAMINE HYDROCHLORIDE 50 MG/ML
12.5 INJECTION INTRAMUSCULAR; INTRAVENOUS
Status: DISCONTINUED | OUTPATIENT
Start: 2022-01-07 | End: 2022-01-07 | Stop reason: HOSPADM

## 2022-01-07 RX ORDER — METOCLOPRAMIDE HYDROCHLORIDE 5 MG/ML
10 INJECTION INTRAMUSCULAR; INTRAVENOUS ONCE
Status: COMPLETED | OUTPATIENT
Start: 2022-01-07 | End: 2022-01-07

## 2022-01-07 RX ORDER — ONDANSETRON 2 MG/ML
4 INJECTION INTRAMUSCULAR; INTRAVENOUS EVERY 6 HOURS PRN
Status: DISCONTINUED | OUTPATIENT
Start: 2022-01-08 | End: 2022-01-09 | Stop reason: HOSPADM

## 2022-01-07 RX ORDER — DIPHENHYDRAMINE HYDROCHLORIDE 50 MG/ML
25 INJECTION INTRAMUSCULAR; INTRAVENOUS EVERY 6 HOURS PRN
Status: DISCONTINUED | OUTPATIENT
Start: 2022-01-08 | End: 2022-01-09 | Stop reason: HOSPADM

## 2022-01-07 RX ORDER — ONDANSETRON 2 MG/ML
4 INJECTION INTRAMUSCULAR; INTRAVENOUS EVERY 6 HOURS PRN
Status: ACTIVE | OUTPATIENT
Start: 2022-01-07 | End: 2022-01-08

## 2022-01-07 RX ORDER — ACETAMINOPHEN 500 MG
1000 TABLET ORAL EVERY 6 HOURS PRN
Status: DISCONTINUED | OUTPATIENT
Start: 2022-01-10 | End: 2022-01-09 | Stop reason: HOSPADM

## 2022-01-07 RX ORDER — KETOROLAC TROMETHAMINE 30 MG/ML
30 INJECTION, SOLUTION INTRAMUSCULAR; INTRAVENOUS EVERY 6 HOURS
Status: DISCONTINUED | OUTPATIENT
Start: 2022-01-08 | End: 2022-01-07

## 2022-01-07 RX ORDER — SODIUM CHLORIDE, SODIUM GLUCONATE, SODIUM ACETATE, POTASSIUM CHLORIDE AND MAGNESIUM CHLORIDE 526; 502; 368; 37; 30 MG/100ML; MG/100ML; MG/100ML; MG/100ML; MG/100ML
1500 INJECTION, SOLUTION INTRAVENOUS ONCE
Status: COMPLETED | OUTPATIENT
Start: 2022-01-07 | End: 2022-01-07

## 2022-01-07 RX ORDER — OXYCODONE HYDROCHLORIDE 10 MG/1
10 TABLET ORAL EVERY 4 HOURS PRN
Status: DISCONTINUED | OUTPATIENT
Start: 2022-01-08 | End: 2022-01-09 | Stop reason: HOSPADM

## 2022-01-07 RX ORDER — SODIUM CHLORIDE, SODIUM LACTATE, POTASSIUM CHLORIDE, CALCIUM CHLORIDE 600; 310; 30; 20 MG/100ML; MG/100ML; MG/100ML; MG/100ML
INJECTION, SOLUTION INTRAVENOUS CONTINUOUS
Status: DISCONTINUED | OUTPATIENT
Start: 2022-01-07 | End: 2022-01-09 | Stop reason: HOSPADM

## 2022-01-07 RX ORDER — OXYTOCIN 10 [USP'U]/ML
10 INJECTION, SOLUTION INTRAMUSCULAR; INTRAVENOUS
Status: DISCONTINUED | OUTPATIENT
Start: 2022-01-07 | End: 2022-01-09 | Stop reason: HOSPADM

## 2022-01-07 RX ORDER — HYDRALAZINE HYDROCHLORIDE 20 MG/ML
5 INJECTION INTRAMUSCULAR; INTRAVENOUS
Status: DISCONTINUED | OUTPATIENT
Start: 2022-01-07 | End: 2022-01-07 | Stop reason: HOSPADM

## 2022-01-07 RX ORDER — ACETAMINOPHEN 500 MG
1000 TABLET ORAL EVERY 6 HOURS
Status: DISCONTINUED | OUTPATIENT
Start: 2022-01-07 | End: 2022-01-07

## 2022-01-07 RX ORDER — OXYCODONE HYDROCHLORIDE 10 MG/1
10 TABLET ORAL EVERY 4 HOURS PRN
Status: ACTIVE | OUTPATIENT
Start: 2022-01-07 | End: 2022-01-08

## 2022-01-07 RX ORDER — ONDANSETRON 2 MG/ML
INJECTION INTRAMUSCULAR; INTRAVENOUS PRN
Status: DISCONTINUED | OUTPATIENT
Start: 2022-01-07 | End: 2022-01-07 | Stop reason: SURG

## 2022-01-07 RX ORDER — OXYCODONE HYDROCHLORIDE 5 MG/1
5 TABLET ORAL EVERY 4 HOURS PRN
Status: DISCONTINUED | OUTPATIENT
Start: 2022-01-08 | End: 2022-01-09 | Stop reason: HOSPADM

## 2022-01-07 RX ORDER — OXYCODONE HCL 5 MG/5 ML
10 SOLUTION, ORAL ORAL
Status: DISCONTINUED | OUTPATIENT
Start: 2022-01-07 | End: 2022-01-07 | Stop reason: HOSPADM

## 2022-01-07 RX ORDER — OXYCODONE HCL 5 MG/5 ML
5 SOLUTION, ORAL ORAL
Status: DISCONTINUED | OUTPATIENT
Start: 2022-01-07 | End: 2022-01-07 | Stop reason: HOSPADM

## 2022-01-07 RX ORDER — IBUPROFEN 800 MG/1
800 TABLET ORAL 3 TIMES DAILY
Status: DISCONTINUED | OUTPATIENT
Start: 2022-01-10 | End: 2022-01-08

## 2022-01-07 RX ORDER — CEFAZOLIN SODIUM 1 G/3ML
2 INJECTION, POWDER, FOR SOLUTION INTRAMUSCULAR; INTRAVENOUS ONCE
Status: COMPLETED | OUTPATIENT
Start: 2022-01-07 | End: 2022-01-07

## 2022-01-07 RX ORDER — DIPHENHYDRAMINE HCL 25 MG
25 TABLET ORAL EVERY 6 HOURS PRN
Status: DISCONTINUED | OUTPATIENT
Start: 2022-01-08 | End: 2022-01-09 | Stop reason: HOSPADM

## 2022-01-07 RX ORDER — MEPERIDINE HYDROCHLORIDE 25 MG/ML
12.5 INJECTION INTRAMUSCULAR; INTRAVENOUS; SUBCUTANEOUS
Status: DISCONTINUED | OUTPATIENT
Start: 2022-01-07 | End: 2022-01-07 | Stop reason: HOSPADM

## 2022-01-07 RX ORDER — IBUPROFEN 800 MG/1
800 TABLET ORAL 3 TIMES DAILY PRN
Status: DISCONTINUED | OUTPATIENT
Start: 2022-01-13 | End: 2022-01-08

## 2022-01-07 RX ORDER — PHENYLEPHRINE HCL IN 0.9% NACL 0.5 MG/5ML
SYRINGE (ML) INTRAVENOUS PRN
Status: DISCONTINUED | OUTPATIENT
Start: 2022-01-07 | End: 2022-01-07 | Stop reason: SURG

## 2022-01-07 RX ORDER — DOCUSATE SODIUM 100 MG/1
100 CAPSULE, LIQUID FILLED ORAL 2 TIMES DAILY PRN
Status: DISCONTINUED | OUTPATIENT
Start: 2022-01-07 | End: 2022-01-09 | Stop reason: HOSPADM

## 2022-01-07 RX ORDER — MIDAZOLAM HYDROCHLORIDE 1 MG/ML
1 INJECTION INTRAMUSCULAR; INTRAVENOUS
Status: DISCONTINUED | OUTPATIENT
Start: 2022-01-07 | End: 2022-01-07 | Stop reason: HOSPADM

## 2022-01-07 RX ORDER — SODIUM CHLORIDE, SODIUM GLUCONATE, SODIUM ACETATE, POTASSIUM CHLORIDE AND MAGNESIUM CHLORIDE 526; 502; 368; 37; 30 MG/100ML; MG/100ML; MG/100ML; MG/100ML; MG/100ML
INJECTION, SOLUTION INTRAVENOUS
Status: DISCONTINUED | OUTPATIENT
Start: 2022-01-07 | End: 2022-01-07 | Stop reason: SURG

## 2022-01-07 RX ORDER — KETOROLAC TROMETHAMINE 30 MG/ML
INJECTION, SOLUTION INTRAMUSCULAR; INTRAVENOUS PRN
Status: DISCONTINUED | OUTPATIENT
Start: 2022-01-07 | End: 2022-01-07 | Stop reason: SURG

## 2022-01-07 RX ORDER — LABETALOL HYDROCHLORIDE 5 MG/ML
5 INJECTION, SOLUTION INTRAVENOUS
Status: DISCONTINUED | OUTPATIENT
Start: 2022-01-07 | End: 2022-01-07 | Stop reason: HOSPADM

## 2022-01-07 RX ORDER — ONDANSETRON 2 MG/ML
4 INJECTION INTRAMUSCULAR; INTRAVENOUS
Status: DISCONTINUED | OUTPATIENT
Start: 2022-01-07 | End: 2022-01-07 | Stop reason: HOSPADM

## 2022-01-07 RX ORDER — CITRIC ACID/SODIUM CITRATE 334-500MG
30 SOLUTION, ORAL ORAL ONCE
Status: DISCONTINUED | OUTPATIENT
Start: 2022-01-07 | End: 2022-01-07 | Stop reason: HOSPADM

## 2022-01-07 RX ORDER — HYDROMORPHONE HYDROCHLORIDE 1 MG/ML
0.1 INJECTION, SOLUTION INTRAMUSCULAR; INTRAVENOUS; SUBCUTANEOUS
Status: DISCONTINUED | OUTPATIENT
Start: 2022-01-07 | End: 2022-01-07 | Stop reason: HOSPADM

## 2022-01-07 RX ORDER — MISOPROSTOL 200 UG/1
800 TABLET ORAL
Status: DISCONTINUED | OUTPATIENT
Start: 2022-01-07 | End: 2022-01-09 | Stop reason: HOSPADM

## 2022-01-07 RX ORDER — SODIUM CHLORIDE, SODIUM LACTATE, POTASSIUM CHLORIDE, CALCIUM CHLORIDE 600; 310; 30; 20 MG/100ML; MG/100ML; MG/100ML; MG/100ML
INJECTION, SOLUTION INTRAVENOUS PRN
Status: DISCONTINUED | OUTPATIENT
Start: 2022-01-07 | End: 2022-01-09 | Stop reason: HOSPADM

## 2022-01-07 RX ORDER — HYDROMORPHONE HYDROCHLORIDE 1 MG/ML
0.2 INJECTION, SOLUTION INTRAMUSCULAR; INTRAVENOUS; SUBCUTANEOUS
Status: ACTIVE | OUTPATIENT
Start: 2022-01-07 | End: 2022-01-08

## 2022-01-07 RX ORDER — OXYCODONE HYDROCHLORIDE 5 MG/1
5 TABLET ORAL EVERY 4 HOURS PRN
Status: ACTIVE | OUTPATIENT
Start: 2022-01-07 | End: 2022-01-08

## 2022-01-07 RX ORDER — MISOPROSTOL 200 UG/1
TABLET ORAL
Status: ACTIVE
Start: 2022-01-07 | End: 2022-01-07

## 2022-01-07 RX ORDER — HYDROMORPHONE HYDROCHLORIDE 1 MG/ML
0.4 INJECTION, SOLUTION INTRAMUSCULAR; INTRAVENOUS; SUBCUTANEOUS
Status: DISCONTINUED | OUTPATIENT
Start: 2022-01-07 | End: 2022-01-07 | Stop reason: HOSPADM

## 2022-01-07 RX ORDER — HYDROMORPHONE HYDROCHLORIDE 1 MG/ML
0.2 INJECTION, SOLUTION INTRAMUSCULAR; INTRAVENOUS; SUBCUTANEOUS
Status: DISCONTINUED | OUTPATIENT
Start: 2022-01-07 | End: 2022-01-07 | Stop reason: HOSPADM

## 2022-01-07 RX ORDER — ONDANSETRON 4 MG/1
4 TABLET, ORALLY DISINTEGRATING ORAL EVERY 6 HOURS PRN
Status: DISCONTINUED | OUTPATIENT
Start: 2022-01-08 | End: 2022-01-09 | Stop reason: HOSPADM

## 2022-01-07 RX ORDER — SODIUM CHLORIDE, SODIUM LACTATE, POTASSIUM CHLORIDE, CALCIUM CHLORIDE 600; 310; 30; 20 MG/100ML; MG/100ML; MG/100ML; MG/100ML
INJECTION, SOLUTION INTRAVENOUS ONCE
Status: ACTIVE | OUTPATIENT
Start: 2022-01-07 | End: 2022-01-08

## 2022-01-07 RX ORDER — DIPHENHYDRAMINE HYDROCHLORIDE 50 MG/ML
25 INJECTION INTRAMUSCULAR; INTRAVENOUS EVERY 6 HOURS PRN
Status: ACTIVE | OUTPATIENT
Start: 2022-01-07 | End: 2022-01-08

## 2022-01-07 RX ADMIN — FAMOTIDINE 20 MG: 10 INJECTION INTRAVENOUS at 07:18

## 2022-01-07 RX ADMIN — METOCLOPRAMIDE 10 MG: 5 INJECTION, SOLUTION INTRAMUSCULAR; INTRAVENOUS at 07:18

## 2022-01-07 RX ADMIN — DOCUSATE SODIUM 100 MG: 100 CAPSULE ORAL at 20:19

## 2022-01-07 RX ADMIN — ACETAMINOPHEN 1000 MG: 500 TABLET ORAL at 20:20

## 2022-01-07 RX ADMIN — SODIUM CHLORIDE, SODIUM GLUCONATE, SODIUM ACETATE, POTASSIUM CHLORIDE AND MAGNESIUM CHLORIDE: 526; 502; 368; 37; 30 INJECTION, SOLUTION INTRAVENOUS at 07:46

## 2022-01-07 RX ADMIN — ONDANSETRON 4 MG: 2 INJECTION INTRAMUSCULAR; INTRAVENOUS at 07:54

## 2022-01-07 RX ADMIN — PHENYLEPHRINE HYDROCHLORIDE 50 MCG/MIN: 10 INJECTION INTRAVENOUS at 07:54

## 2022-01-07 RX ADMIN — CEFAZOLIN 2 G: 330 INJECTION, POWDER, FOR SOLUTION INTRAMUSCULAR; INTRAVENOUS at 07:56

## 2022-01-07 RX ADMIN — ACETAMINOPHEN 1000 MG: 500 TABLET ORAL at 13:58

## 2022-01-07 RX ADMIN — Medication 125 ML/HR: at 10:31

## 2022-01-07 RX ADMIN — KETOROLAC TROMETHAMINE 30 MG: 30 INJECTION, SOLUTION INTRAMUSCULAR; INTRAVENOUS at 13:58

## 2022-01-07 RX ADMIN — FENTANYL CITRATE 15 MCG: 50 INJECTION, SOLUTION INTRAMUSCULAR; INTRAVENOUS at 07:54

## 2022-01-07 RX ADMIN — KETOROLAC TROMETHAMINE 30 MG: 30 INJECTION, SOLUTION INTRAMUSCULAR; INTRAVENOUS at 20:19

## 2022-01-07 RX ADMIN — SODIUM CHLORIDE, SODIUM GLUCONATE, SODIUM ACETATE, POTASSIUM CHLORIDE AND MAGNESIUM CHLORIDE 1000 ML: 526; 502; 368; 37; 30 INJECTION, SOLUTION INTRAVENOUS at 07:19

## 2022-01-07 RX ADMIN — Medication 100 MCG: at 07:54

## 2022-01-07 RX ADMIN — KETOROLAC TROMETHAMINE 30 MG: 30 INJECTION, SOLUTION INTRAMUSCULAR at 08:34

## 2022-01-07 RX ADMIN — BUPIVACAINE HYDROCHLORIDE IN DEXTROSE 1.6 ML: 7.5 INJECTION, SOLUTION SUBARACHNOID at 07:54

## 2022-01-07 RX ADMIN — MORPHINE SULFATE 150 MCG: 0.5 INJECTION, SOLUTION EPIDURAL; INTRATHECAL; INTRAVENOUS at 07:54

## 2022-01-07 RX ADMIN — OXYTOCIN 1000 ML: 10 INJECTION, SOLUTION INTRAMUSCULAR; INTRAVENOUS at 08:19

## 2022-01-07 ASSESSMENT — LIFESTYLE VARIABLES
TOTAL SCORE: 0
TOTAL SCORE: 0
EVER_SMOKED: NEVER
HAVE YOU EVER FELT YOU SHOULD CUT DOWN ON YOUR DRINKING: NO
ALCOHOL_USE: NO
CONSUMPTION TOTAL: NEGATIVE
EVER FELT BAD OR GUILTY ABOUT YOUR DRINKING: NO
EVER HAD A DRINK FIRST THING IN THE MORNING TO STEADY YOUR NERVES TO GET RID OF A HANGOVER: NO
AVERAGE NUMBER OF DAYS PER WEEK YOU HAVE A DRINK CONTAINING ALCOHOL: 0
DOES PATIENT WANT TO STOP DRINKING: NO
HOW MANY TIMES IN THE PAST YEAR HAVE YOU HAD 5 OR MORE DRINKS IN A DAY: 0
TOTAL SCORE: 0
HAVE PEOPLE ANNOYED YOU BY CRITICIZING YOUR DRINKING: NO
ON A TYPICAL DAY WHEN YOU DRINK ALCOHOL HOW MANY DRINKS DO YOU HAVE: 0

## 2022-01-07 ASSESSMENT — PATIENT HEALTH QUESTIONNAIRE - PHQ9
1. LITTLE INTEREST OR PLEASURE IN DOING THINGS: NOT AT ALL
SUM OF ALL RESPONSES TO PHQ9 QUESTIONS 1 AND 2: 0
2. FEELING DOWN, DEPRESSED, IRRITABLE, OR HOPELESS: NOT AT ALL
2. FEELING DOWN, DEPRESSED, IRRITABLE, OR HOPELESS: NOT AT ALL
1. LITTLE INTEREST OR PLEASURE IN DOING THINGS: NOT AT ALL
SUM OF ALL RESPONSES TO PHQ9 QUESTIONS 1 AND 2: 0

## 2022-01-07 ASSESSMENT — PAIN DESCRIPTION - PAIN TYPE
TYPE: SURGICAL PAIN
TYPE: SURGICAL PAIN;ACUTE PAIN

## 2022-01-07 ASSESSMENT — PAIN SCALES - GENERAL: PAIN_LEVEL: 0

## 2022-01-07 NOTE — ANESTHESIA PROCEDURE NOTES
Spinal Block    Date/Time: 1/7/2022 7:50 AM  Performed by: Clinton Brown D.O.  Authorized by: Clinton Brown D.O.     Patient Location:  OR  Start Time:  1/7/2022 7:50 AM  End Time:  1/7/2022 7:54 AM  Reason for Block: primary anesthetic    patient identified, IV checked, site marked, risks and benefits discussed, surgical consent, monitors and equipment checked, pre-op evaluation and timeout performed    Patient Position:  Sitting  Prep: ChloraPrep, patient draped and sterile technique    Monitoring:  Blood pressure, continuous pulse oximetry and heart rate  Approach:  Midline  Location:  L4-5  Injection Technique:  Single-shot  Skin infiltration:  Lidocaine  Strength:  1%  Dose:  3ml  Needle Type:  Pencan  Needle Gauge:  25 G  CSF flowing pre/post injection:  Yes  Sensory Level:  T4

## 2022-01-07 NOTE — ANESTHESIA POSTPROCEDURE EVALUATION
Patient: Narendra Capone    Procedure Summary     Date: 22 Room / Location: [facility] OR 02 / SURGERY LABOR AND DELIVERY    Anesthesia Start: 746 Anesthesia Stop: 853    Procedure:  SECTION, PRIMARY (N/A Abdomen) Diagnosis: (Same as pre-op; delivered )    Surgeons: Joel Poole M.D. Responsible Provider: Clinton Brown D.O.    Anesthesia Type: spinal ASA Status: 2          Final Anesthesia Type: spinal  Last vitals  BP   Blood Pressure: 108/67    Temp   36.5 °C (97.7 °F)    Pulse   (!) 59   Resp   20    SpO2   96 %      Anesthesia Post Evaluation    Patient location during evaluation: PACU  Patient participation: complete - patient participated  Level of consciousness: awake and alert  Pain score: 0    Airway patency: patent  Anesthetic complications: no  Cardiovascular status: hemodynamically stable  Respiratory status: acceptable  Hydration status: euvolemic    PONV: none          No complications documented.     Nurse Pain Score: 0 (NPRS)

## 2022-01-07 NOTE — OP REPORT
DATE OF SERVICE:  2022     OPERATION:  Repeat low transverse  section.     SURGEON:  Joel Poole MD     ASSISTANT:  Elma Saleem MD     ANESTHESIOLOGIST:  Clinton Brown DO     ANESTHESIA:  Spinal.     PREOPERATIVE DIAGNOSES:  1.  A 39 and 3/7 week gestation.  2.  Previous  section.  3.  Presumed diet-controlled gestational diabetes mellitus.  4.  Group B streptococcus positive.  5.  COVID positive, asymptomatic.     POSTOPERATIVE DIAGNOSES:  1.  A 39 and 3/7 week gestation.  2.  Previous  section.  3.  Presumed diet-controlled gestational diabetes mellitus.  4.  Group B streptococcus positive.  5.  COVID positive, asymptomatic.     COMPLICATIONS:  None.     ESTIMATED BLOOD LOSS:  600 mL.     FINDINGS:  Baby --- male, 1 minute Apgar 8, 5 minute Apgar 9, weight 2720 grams.     INDICATIONS:  This 31-year-old lady is now G2, P2.  She came for repeat    at term.  Prenatal care was remarkable for diet-controlled   gestational diabetes and group B strep positivity.    Her preop COVID test, 3 days before the surgery, was positive.  She is  asymptomatic.  We did discuss postpartum issues,  breastfeeding guidelines, etc.     DESCRIPTION OF PROCEDURE:  The patient went to the OR.  Spinal anesthesia was   administered.  She was prepped and draped.  Timeout was done.  I made a small   Pfannenstiel skin incision by excising her old Pfannenstiel scar.  I incised a   very thin layer of subcutaneous fat.  I incised the rectus fascia   transversely.  I  the fascia from the rectus muscles with scissors.    I entered the peritoneum in the midline well away from the bladder.  The   peritoneal incision was enlarged.  An Medardo O retractor was placed.  There   were no pelvic adhesions noted.  The lower uterus was not unusually thin.  I   incised the vesicouterine peritoneum transversely.  I pushed the bladder off   of the lower uterine segment.  I made a low transverse  myometrial incision.  I   pierced the membranes with a hemostat revealing clear fluid.  The baby's head   was extracted from left occiput transverse position with no difficulty.  I   bulb suctioned the mouth and nares.  There were no nuchal cords.    The cord was loosely-wrapped around the baby's thorax once.The   shoulders and body delivered easily.  Thirty seconds after the delivery was   complete, we doubly clamped and cut the cord and handed the baby off.    Gentle cord traction and fundal massage produced the intact placenta and all   trailing membranes.  I sponge curetted the endometrial cavity to insure that   there were no retained products of conception.  I closed the myometrium with   running interlocking 0 Vicryl.  I placed a second imbricating layer of 0   Vicryl, obtaining good approximation and hemostasis.  Both fallopian tubes and   ovaries appeared normal.  The Medardo O was removed.  I closed the anterior   parietal peritoneum and the posterior layer of the rectus sheath with running   2-0 Vicryl, I closed both layers of the rectus abdominis fascia with running 0   PDS.  I closed Benito's fascia with running 3-0 Vicryl.  I closed the skin   with Insorb resorbable subcutaneous staples, 1/2 inch wide Steri-Strips and a   Mepilex Ag dressing were placed.  Sponge and needle counts were correct.        ______________________________  MD PERRY Rangel/ASS    DD:  01/07/2022 09:01  DT:  01/07/2022 09:24    Job#:  [id number]

## 2022-01-07 NOTE — ANESTHESIA TIME REPORT
Anesthesia Start and Stop Event Times     Date Time Event    1/7/2022 0729 Ready for Procedure     0746 Anesthesia Start     0853 Anesthesia Stop        Responsible Staff  01/07/22    Name Role Begin End    Clinton Brown D.O. Anesth 0746 0853        Preop Diagnosis (Free Text):  Pre-op Diagnosis     BREECH; GESTATIONAL DIABETES        Preop Diagnosis (Codes):    Premium Reason  Non-Premium    Comments:

## 2022-01-07 NOTE — PROGRESS NOTES
Assumed care of patient at 0700 from night shift RN. Bedside SBAR report completed. Pt pre-op completed. To OR at 0746. Surgery 0808 to 0845. Pt to room 212 for recovery at 0853. Recovery from 0855 to 0955. Transferred pt via stretcher to room 326 at 1010. Bedside SBAR completed with Shayy CRAIG. Bands verified. Care relinquished to Shayy CRAIG at 1030.

## 2022-01-07 NOTE — OR SURGEON
Immediate Post OP Note    PreOp Diagnosis:     1.  A 39 and 3/7th weeks' gestation.  2.  Previous  section.  3.  Diet-controlled gestational diabetes mellitus, presumed.  4.  Group B streptococcus positive.  5.  COVID-positive, asymptomatic      PostOp Diagnosis:     1.  A 39 and 3/7th weeks' gestation.  2.  Previous  section.  3.  Diet-controlled gestational diabetes mellitus, presumed.  4.  Group B streptococcus positive.  5.  COVID-positive, asymptomatic      Procedure(s):   SECTION, repeat    Surgeon(s):  LARRY Harris M.D.    Anesthesiologist/Type of Anesthesia:  Anesthesiologist: Clinton Brown D.O./* No anesthesia type entered *    Surgical Staff:  * No surgical staff found *    Specimens removed if any:  * No specimens in log *    Estimated Blood Loss: 600 cc    Findings: baby-male, APGARS 8-9, 2720 Grams    Complications: none        2022 8:53 AM Joel Poole M.D.

## 2022-01-07 NOTE — ANESTHESIA PREPROCEDURE EVALUATION
Case: [id number] Date/Time: 22    Procedure:  SECTION, PRIMARY    Pre-op diagnosis: BREECH; GESTATIONAL DIABETES    Location: [facility] OR 01 / SURGERY LABOR AND DELIVERY    Surgeons: Joel Poole M.D.         @ 39w3d, IUP, [place]  Relevant Problems   OB   (positive) Oral hypoglycemic controlled White classification A2 gestational diabetes mellitus (GDM)       Physical Exam    Airway   Mallampati: II  TM distance: >3 FB  Neck ROM: full       Cardiovascular - normal exam  Rhythm: regular  Rate: normal  (-) murmur     Dental - normal exam           Pulmonary - normal exam  Breath sounds clear to auscultation     Abdominal    Neurological - normal exam                 Anesthesia Plan    ASA 2       Plan - spinal   Neuraxial block will be primary anesthetic                Postoperative Plan: Postoperative administration of opioids is intended.    Pertinent diagnostic labs and testing reviewed    Informed Consent:    Anesthetic plan and risks discussed with patient.

## 2022-01-08 PROCEDURE — 770002 HCHG ROOM/CARE - OB PRIVATE (112)

## 2022-01-08 PROCEDURE — A9270 NON-COVERED ITEM OR SERVICE: HCPCS | Performed by: OBSTETRICS & GYNECOLOGY

## 2022-01-08 PROCEDURE — 700102 HCHG RX REV CODE 250 W/ 637 OVERRIDE(OP): Performed by: OBSTETRICS & GYNECOLOGY

## 2022-01-08 PROCEDURE — 700111 HCHG RX REV CODE 636 W/ 250 OVERRIDE (IP): Performed by: ANESTHESIOLOGY

## 2022-01-08 RX ORDER — IBUPROFEN 800 MG/1
800 TABLET ORAL 3 TIMES DAILY
Status: DISCONTINUED | OUTPATIENT
Start: 2022-01-08 | End: 2022-01-09 | Stop reason: HOSPADM

## 2022-01-08 RX ORDER — IBUPROFEN 800 MG/1
800 TABLET ORAL 3 TIMES DAILY PRN
Status: DISCONTINUED | OUTPATIENT
Start: 2022-01-11 | End: 2022-01-09 | Stop reason: HOSPADM

## 2022-01-08 RX ADMIN — ACETAMINOPHEN 1000 MG: 500 TABLET ORAL at 16:11

## 2022-01-08 RX ADMIN — ACETAMINOPHEN 1000 MG: 500 TABLET ORAL at 11:18

## 2022-01-08 RX ADMIN — IBUPROFEN 800 MG: 800 TABLET, FILM COATED ORAL at 16:02

## 2022-01-08 RX ADMIN — KETOROLAC TROMETHAMINE 30 MG: 30 INJECTION, SOLUTION INTRAMUSCULAR; INTRAVENOUS at 09:16

## 2022-01-08 RX ADMIN — ACETAMINOPHEN 1000 MG: 500 TABLET ORAL at 03:35

## 2022-01-08 RX ADMIN — PRENATAL WITH FERROUS FUM AND FOLIC ACID 1 TABLET: 3080; 920; 120; 400; 22; 1.84; 3; 20; 10; 1; 12; 200; 27; 25; 2 TABLET ORAL at 09:17

## 2022-01-08 RX ADMIN — KETOROLAC TROMETHAMINE 30 MG: 30 INJECTION, SOLUTION INTRAMUSCULAR; INTRAVENOUS at 03:35

## 2022-01-08 RX ADMIN — ACETAMINOPHEN 1000 MG: 500 TABLET ORAL at 21:44

## 2022-01-08 RX ADMIN — DOCUSATE SODIUM 100 MG: 100 CAPSULE ORAL at 16:12

## 2022-01-08 ASSESSMENT — PAIN DESCRIPTION - PAIN TYPE
TYPE: SURGICAL PAIN;ACUTE PAIN
TYPE: SURGICAL PAIN;ACUTE PAIN
TYPE: ACUTE PAIN

## 2022-01-08 NOTE — PROGRESS NOTES
POD #1    S:  Pt doing well.  Minimal lochia.  Voiding well. Passing flatus. Mel reg diet. Breast feeding.  Pain controlled.  No SOB from COVID.    O: T 98  P 76  /62  ABD: Soft, ND, minimally tender, BS + normoactive, FF below umb, dressing dry  EXT: trace pedal edema, no cords or Homans  H/H 10/31  O+  GBS +    A/P:  POD #1 S/P repeat C/S, GDMA1, COVID - doing well  1.  GDMA1 - no further BS need to be checked  2.  Continue routine pp/post op care  3.  COVID +  Continue precautions

## 2022-01-08 NOTE — PROGRESS NOTES
Assessed patient, vital signs stable, fundus firm, lochia light. Patient has scheduled pan medication.  Denies Covid symptoms. Discussed plan of care.

## 2022-01-08 NOTE — LACTATION NOTE
This note was copied from a baby's chart.  31yr, , 39wk3d    Baby in first 24 hours and sleepy.  MOB asking for assistance.  Baby skin to skin and asleep with mom.  One low blood sugar and baby was given glucose and 15ml formula at 1730.  Lab in room for follow up BS lab draw,    Discussed breastfeeding plan with mom.  Mob wishes to breastfeed.  Was not successful with first baby.  Educated the importance of keeping baby close, skin to skin when possible when mom awake, and to become aware and learn feeding cues and to allow baby to breastfeed whenever he is showing cues.  Discussed importance of a proper latch and to request assistance if pain or if unable to get baby to latch.      Taught mom HE however, no colostrum was able to be expressed.  A small glistening was seen after several attempt.  Breasts tissue and areola is firm.      Breastfeeding plan for the night is to allow baby to remain skin to skin while mom awake and to breastfeed when he shows cues or interest. Will reassess his behavior in the morning and check weight loss and attempt to work with his latching.  Mom did report that baby latched and  well for 30 minutes after delivery when she was in recovery room.  Encouraged mom to practice hand expression for a few minutes every few hours until baby starts to show more interest in feeding.

## 2022-01-08 NOTE — PROGRESS NOTES
1900- Report received from KIARA Lucas. Assumed care of patient.    2000- Assessment complete. Fundus firm, lochia scant/light rubra. Scheduled pain medication given (see MAR). Mepilex silver dressing in place over incision, CDI. Abdominal binder explained and provided for patient. Infant on glucose algorithm. SO at bedside. All questions and concerns discussed at this time. Encouraged parents to call with needs.

## 2022-01-08 NOTE — CARE PLAN
The patient is Stable - Low risk of patient condition declining or worsening    Shift Goals  Clinical Goals: Pain management, work on breastfeed    Progress made toward(s) clinical / shift goals: Patient pain WDL with scheduled anesthesia medication (see MAR). Robertson catheter in place, adequate output. Ambulating without difficulty per pt.    Patient is not progressing towards the following goals:

## 2022-01-09 ENCOUNTER — PHARMACY VISIT (OUTPATIENT)
Dept: PHARMACY | Facility: MEDICAL CENTER | Age: 32
End: 2022-01-09
Payer: COMMERCIAL

## 2022-01-09 VITALS
RESPIRATION RATE: 20 BRPM | BODY MASS INDEX: 28.66 KG/M2 | OXYGEN SATURATION: 98 % | HEART RATE: 81 BPM | WEIGHT: 172 LBS | TEMPERATURE: 97.9 F | HEIGHT: 65 IN | SYSTOLIC BLOOD PRESSURE: 113 MMHG | DIASTOLIC BLOOD PRESSURE: 81 MMHG

## 2022-01-09 PROCEDURE — A9270 NON-COVERED ITEM OR SERVICE: HCPCS | Performed by: OBSTETRICS & GYNECOLOGY

## 2022-01-09 PROCEDURE — 700102 HCHG RX REV CODE 250 W/ 637 OVERRIDE(OP): Performed by: OBSTETRICS & GYNECOLOGY

## 2022-01-09 PROCEDURE — RXMED WILLOW AMBULATORY MEDICATION CHARGE: Performed by: OBSTETRICS & GYNECOLOGY

## 2022-01-09 RX ORDER — OXYCODONE HYDROCHLORIDE 5 MG/1
5 TABLET ORAL EVERY 4 HOURS PRN
Qty: 30 TABLET | Refills: 0 | Status: SHIPPED | OUTPATIENT
Start: 2022-01-09 | End: 2022-01-16

## 2022-01-09 RX ADMIN — ACETAMINOPHEN 1000 MG: 500 TABLET ORAL at 04:48

## 2022-01-09 RX ADMIN — IBUPROFEN 800 MG: 800 TABLET, FILM COATED ORAL at 11:59

## 2022-01-09 RX ADMIN — IBUPROFEN 800 MG: 800 TABLET, FILM COATED ORAL at 04:51

## 2022-01-09 RX ADMIN — DOCUSATE SODIUM 100 MG: 100 CAPSULE ORAL at 04:48

## 2022-01-09 RX ADMIN — PRENATAL WITH FERROUS FUM AND FOLIC ACID 1 TABLET: 3080; 920; 120; 400; 22; 1.84; 3; 20; 10; 1; 12; 200; 27; 25; 2 TABLET ORAL at 08:33

## 2022-01-09 ASSESSMENT — EDINBURGH POSTNATAL DEPRESSION SCALE (EPDS)
THE THOUGHT OF HARMING MYSELF HAS OCCURRED TO ME: NEVER
I HAVE FELT SCARED OR PANICKY FOR NO GOOD REASON: NO, NOT AT ALL
I HAVE LOOKED FORWARD WITH ENJOYMENT TO THINGS: AS MUCH AS I EVER DID
THINGS HAVE BEEN GETTING ON TOP OF ME: NO, I HAVE BEEN COPING AS WELL AS EVER
I HAVE BLAMED MYSELF UNNECESSARILY WHEN THINGS WENT WRONG: NOT VERY OFTEN
I HAVE BEEN ANXIOUS OR WORRIED FOR NO GOOD REASON: HARDLY EVER
I HAVE BEEN SO UNHAPPY THAT I HAVE HAD DIFFICULTY SLEEPING: NOT AT ALL
I HAVE BEEN ABLE TO LAUGH AND SEE THE FUNNY SIDE OF THINGS: AS MUCH AS I ALWAYS COULD
I HAVE BEEN SO UNHAPPY THAT I HAVE BEEN CRYING: NO, NEVER
I HAVE FELT SAD OR MISERABLE: NO, NOT AT ALL

## 2022-01-09 ASSESSMENT — PAIN DESCRIPTION - PAIN TYPE
TYPE: SURGICAL PAIN
TYPE: SURGICAL PAIN;ACUTE PAIN
TYPE: SURGICAL PAIN;ACUTE PAIN

## 2022-01-09 NOTE — PROGRESS NOTES
Discharge instructions reviewed with patient.  screen, patient and baby f/u appts also reviewed with patient . Car seat present. Birth certificate done.  patient received meds to bed. .Bands matched and security tag removed.Mom and baby d/c'd to home.

## 2022-01-09 NOTE — PROGRESS NOTES
Report received from KIARA Staples. Assumed care of patient. Assessment complete. Fundus firm, lochia light rubra. Mepilex dressing CDI. No drainage noted. Patient voiding and ambulating without difficulty. Pain medication given (see MAR). Will continue to monitor.

## 2022-01-09 NOTE — DISCHARGE PLANNING
Meds-to-Beds: Discharge prescription orders listed below. Medications delivered to RN.    Medications  oxyCODONE immediate-release (ROXICODONE) 5 MG Tab  Take 1 Tablet by mouth every 4 hours as needed for up to 7 days  Disp-30 Tablet, R-0    Brianna La, Pharmacy Intern

## 2022-01-09 NOTE — CARE PLAN
The patient is Stable - Low risk of patient condition declining or worsening    Shift Goals  Clinical Goals: VSS, pain management    Progress made toward(s) clinical / shift goals:  VSS, pain managed well with PRN medications. Ambulating and voiding without difficulty per pt.    Patient is not progressing towards the following goals:

## 2022-01-09 NOTE — DISCHARGE SUMMARY
DATE OF ADMISSION:  2022   DATE OF DISCHARGE:  2022     ADMITTING DIAGNOSES:  1.  Intrauterine pregnancy at 39 and 3/7th weeks.  2.  Prior  section.  3.  Diet-controlled gestational diabetes mellitus.  4.  Group B strep positive.  5.  COVID positive.     Please see previously dictated history and physical.     HOSPITAL COURSE:  The patient was admitted to labor and delivery on 2022   with the above diagnoses.  She subsequently underwent a repeat lower uterine   segment transverse  section.  She had an estimated blood loss for the   procedure of 600 mL. She had a viable male infant, weight 2720 grams, Apgars   were 8 and 9.  The patient's postpartum and postoperative course were   unremarkable.  She was asymptomatic from her COVID infection.  On   postoperative day #2, she had minimal lochia, was tolerating a regular diet,   passing flatus, had no problems voiding, was breastfeeding and was ready to be   discharged home.     PHYSICAL EXAMINATION ON DISCHARGE:  VITAL SIGNS:  Temperature is 97.2, pulse 82, blood pressure 118/82.  ABDOMEN:  Soft, nontender.  Fundus is firm below the umbilicus.  Her dressing   is dry.  EXTREMITIES:  Show trace pedal edema.  She has no cords or Homans sign.     LABORATORY DATA:  Her H and H were 10 and 31. Blood type is O positive, group   B strep status is positive.     DISCHARGE DIAGNOSES:  1.  Intrauterine pregnancy at 39 and 3/7th weeks.  2.  Status post repeat lower uterine segment transverse  section.  3.  Gestational diabetes mellitus type A1.  4.  Group B strep positive.  5.  COVID infection.     DISCHARGE INSTRUCTIONS:  1.  The patient will be discharged home with instructions to follow up with   Dr. Poole in 2 weeks.  2.  She is instructed on pelvic rest for 6 weeks, no driving for 2 weeks and   no lifting over 10 pounds for 6 weeks.  3.  She will need a 2-hour 75 grams oral glucose tolerance test 8 weeks   postpartum.  4.  She is  given a prescription for Roxicodone and Motrin.        ______________________________  MD ANDREW LEIGH/RON    DD:  01/09/2022 08:28  DT:  01/09/2022 09:12    Job#:  [id number]    CC:CHATA RYDER MD

## 2022-01-09 NOTE — PROGRESS NOTES
POD #2    S:  Pt doing well. Minimal lochia  Mel reg diet.  + flatus.  No problems voiding. Breast feeding.  Ready to go home    O: T 97/2 P 82  /82  ABD:Soft, NT, FF below umb dressing dry  EXT: Trace pedal edema, no cords or Homans  H/H 10/31  O+  GBS +    A/P:  POD #2 S/P repeat C/S at term, GDMA1, COVID - doing well  1.  D/C home   2.  F/U Virgilio Rudd 2 weeks  3. Pelvic rest for 6 weeks  4. No lifting > 10 pounds for 6 weeks  Needs 2 hour 75 gram OGTT 8 weeks pp  5. Rx for Motrin and  Roxicodone

## 2022-01-09 NOTE — LACTATION NOTE
This note was copied from a baby's chart.  Mother is reporting that she is latching baby for 15 minutes or more each feeding, and then offering him some formula afterwards. She has not initiated use of breast pump yet so we discussed this; she will likely start using the pump tomorrow.She plans to use pump at home to augment her milk production as she has history of GDM, PCOS, and low milk production.     I suggested spending her time tonight working on getting more comfortable with handling the baby independently as she says this makes her nervous. She has been doing breast massage and hand expression.    Also recommended that she get out of the hospital bed and sit up in a chair for feedings. Keeping baby skin to skin while holding him and feeding him are optimal.

## 2022-01-09 NOTE — DISCHARGE INSTRUCTIONS
PATIENT DISCHARGE EDUCATION INSTRUCTION SHEET  REASONS TO CALL YOUR OBSTETRICIAN  · Persistent fever, shaking, chills (Temperature higher than 100.4) may indicate you have an infection  · Heavy bleeding: soaking more than 1 pad per hour; Passing clots an egg-sized clot or bigger may mean you have an postpartum hemorrhage  · Foul odor from vagina or bad smelling or discolored discharge or blood  · Breast infection (Mastitis symptoms); breast pain, chills, fever, redness or red streaks, may feel flu like symptoms  · Urinary pain, burning or frequency  · Incision that is not healing, increased redness, swelling, tenderness or pain, or any pus from episiotomy or  site may mean you have an infection  · Redness, swelling, warmth, or painful to touch in the calf area of your leg may mean you have a blood clot  · Severe or intensified depression, thoughts or feelings of wanting to hurt yourself or someone else   · Pain in chest, obstructed breathing or shortness of breath (trouble catching your breath) may mean you are having a postpartum complication. Call your provider immediately   · Headache that does not get better, even after taking medicine, a bad headache with vision changes or pain in the upper right area of your belly may mean you have high blood pressure or post birth preeclampsia. Call your provider immediately    HAND WASHING  All family and friends should wash their hands:  · Before and after holding the baby  · Before feeding the baby  · After using the restroom or changing the baby's diaper    WOUND CARE  Ask your physician for additional care instructions. In general:  ·  Incision:  · May shower and pat incision dry   · Keep the incision clean and dry  · There should not be any opening or pus from the incision  · Continue to walk at home 3 times a day   · Do NOT lift anything heavier than your baby (over 10 pounds)  · Encourage family to help participate in care of the  to allow  rest and mom time to heal  · Episiotomy/Laceration  · May use patrice-spray bottle, witch hazel pads and dermaplast spray for comfort  · Use patrice-spray bottle after urinating to cleanse perineal area  · To prevent burning during urination spray patrice-water bottle on labial area   · Pat perineal area dry until episiotomy/laceration is healed  · Continue to use patrice-bottle until bleeding stops as needed  · If have a 2nd degree laceration or greater, a Sitz bath can offer relief from soreness, burning, and inflammation   · Sitz Bath   · Sit in 6 inches of warm water and soak laceration as needed until the laceration heals    VAGINAL CARE AND BLEEDING  · Nothing inside vagina for 6 weeks:   · No sexual intercourse, tampons or douching  · Bleeding may continue for 2-4 weeks. Amount and color may vary  · Soaking 1 pad or more in an hour for several hours is considered heavy bleeding  · Passing large egg sized blood clots can be concerning  · If you feel like you have heavy bleeding or are having increasing amount of blood clots call your Obstetrician immediately  · If you begin feeling faint upon standing, feeling sick to your stomach, have clammy skin, a really fast heartbeat, have chills, start feeling confused, dizzy, sleepy or weak, or feeling like you're going to faint call your Obstetrician immediately    HYPERTENSION   Preeclampsia or gestational hypertension are types of high blood pressure that only pregnant women can get. It is important for you to be aware of symptoms to seek early intervention and treatment. If you have any of these symptoms immediately call your Obstetrician    · Vision changes or blurred vision   · Severe headache or pain that is unrelieved with medication and will not go away  · Persistent pain in upper abdomen or shoulder   · Increased swelling of face, feet, or hands  · Difficulty breathing or shortness of breath at rest  · Urinating less than usual    URINATION AND BOWEL MOVEMENTS  · Eating  "more fiber (bran cereal, fruits, and vegetables) and drinking plenty of fluids will help to avoid constipation  · Urinary frequency and urgency after childbirth is normal  · If you experience any urinary pain, burning or frequency call your provider    BIRTH CONTROL  · It is possible to become pregnant at any time after delivery and while breastfeeding  · Plan to discuss a method of birth control with your physician at your post delivery follow up visit    POSTPARTUM BLUES  During the first few days after birth, you may experience a sense of the \"blues\" which may include impatience, irritability or even crying. These feelings come and go quickly. However, as many as 1 in 10 women experience emotional symptoms known as postpartum depression.     POSTPARTUM DEPRESSION    May start as early as the second or third day after delivery or take several weeks or months to develop. Symptoms of \"blues\" are present, but are more intense: Crying spells; loss of appetite; feelings of hopelessness or loss of control; fear of touching the baby; over concern or no concern at all about the baby; little or no concern about your own appearance/caring for yourself; and/or inability to sleep or excessive sleeping. Contact your Obstetrician if you are experiencing any of these symptoms     PREVENTING SHAKEN BABY  If you are angry or stressed, PUT THE BABY IN THE CRIB, step away, take some deep breaths, and wait until you are calm to care for the baby. DO NOT SHAKE THE BABY. You are not alone, call a supporter for help.  · [facility] 24/7 crisis call line (507-544-9819) or (1-158.718.6683)  · You can also text them, text \"ANSWER\" (689707)      "

## 2022-01-09 NOTE — PROGRESS NOTES
Assessment done. Pt. Has no c/o pain,medicated earlier.Fundus firm.Lochia light. Fob at bedside,supportive.Pt. denies fever,coughing or sore throat. Fine crackles heard on auscultation.patient denies difficulty breathing and sat's at 98% on room air. Assisted with breastfeeding and successful latch obtained on right breast.patient has lactation outpatient assistance with [facility].

## 2022-01-09 NOTE — LACTATION NOTE
Reinforced normal breast feeding behaviors in a  for the first few days with parents. Discussed signs of good hydration. Mom has some minor abrasion to her left nipple so lanolin cream explained and provided. She reports feeling more confident today with her latching.    I offered to send a referral for out-patient but mother prefers to call and schedule herself.She has a breast pump kit for [facility] but is unsure if she will obtain one yet. CDC guidelines on milk storage and, slow paced bottle feeding method have been reviewed. Mother watched [employer] BF videos last night.

## 2022-01-20 ENCOUNTER — APPOINTMENT (OUTPATIENT)
Dept: OBGYN | Facility: CLINIC | Age: 32
End: 2022-01-20
Payer: COMMERCIAL

## 2022-09-09 ENCOUNTER — HOSPITAL ENCOUNTER (OUTPATIENT)
Dept: LAB | Facility: MEDICAL CENTER | Age: 32
End: 2022-09-09
Attending: PHYSICIAN ASSISTANT
Payer: COMMERCIAL

## 2022-09-09 PROCEDURE — 88175 CYTOPATH C/V AUTO FLUID REDO: CPT

## 2022-09-09 PROCEDURE — 87624 HPV HI-RISK TYP POOLED RSLT: CPT

## 2022-09-13 LAB
CYTOLOGY REG CYTOL: NORMAL
HPV HR 12 DNA CVX QL NAA+PROBE: NEGATIVE
HPV16 DNA SPEC QL NAA+PROBE: NEGATIVE
HPV18 DNA SPEC QL NAA+PROBE: NEGATIVE
SPECIMEN SOURCE: NORMAL

## 2022-10-06 ENCOUNTER — HOSPITAL ENCOUNTER (OUTPATIENT)
Facility: MEDICAL CENTER | Age: 32
End: 2022-10-06
Attending: PHYSICIAN ASSISTANT
Payer: COMMERCIAL

## 2022-10-06 ENCOUNTER — HOSPITAL ENCOUNTER (OUTPATIENT)
Dept: LAB | Facility: MEDICAL CENTER | Age: 32
End: 2022-10-06
Attending: PHYSICIAN ASSISTANT

## 2022-10-06 PROCEDURE — 87591 N.GONORRHOEAE DNA AMP PROB: CPT

## 2022-10-06 PROCEDURE — 87491 CHLMYD TRACH DNA AMP PROBE: CPT

## 2022-10-07 LAB
C TRACH DNA GENITAL QL NAA+PROBE: NEGATIVE
N GONORRHOEA DNA GENITAL QL NAA+PROBE: NEGATIVE
SPECIMEN SOURCE: NORMAL

## 2023-01-13 ENCOUNTER — OFFICE VISIT (OUTPATIENT)
Dept: URGENT CARE | Facility: CLINIC | Age: 33
End: 2023-01-13
Payer: COMMERCIAL

## 2023-01-13 VITALS
RESPIRATION RATE: 16 BRPM | TEMPERATURE: 98.2 F | HEIGHT: 65 IN | BODY MASS INDEX: 23.32 KG/M2 | HEART RATE: 90 BPM | SYSTOLIC BLOOD PRESSURE: 110 MMHG | WEIGHT: 140 LBS | DIASTOLIC BLOOD PRESSURE: 72 MMHG | OXYGEN SATURATION: 98 %

## 2023-01-13 DIAGNOSIS — H61.22 HEARING LOSS DUE TO CERUMEN IMPACTION, LEFT: ICD-10-CM

## 2023-01-13 DIAGNOSIS — H61.22 IMPACTED CERUMEN OF LEFT EAR: ICD-10-CM

## 2023-01-13 PROCEDURE — 99202 OFFICE O/P NEW SF 15 MIN: CPT | Performed by: FAMILY MEDICINE

## 2023-01-13 RX ORDER — FLUTICASONE PROPIONATE 50 MCG
SPRAY, SUSPENSION (ML) NASAL
COMMUNITY
Start: 2022-12-12

## 2023-01-13 ASSESSMENT — ENCOUNTER SYMPTOMS
CHILLS: 0
SORE THROAT: 0
DIZZINESS: 0
VOMITING: 0
MYALGIAS: 0
COUGH: 0
NAUSEA: 0
SHORTNESS OF BREATH: 0
FEVER: 0

## 2023-01-13 NOTE — PROGRESS NOTES
Subjective:   Narendra Capone is a 32 y.o. female who presents for Ear Fullness (X 2 days, RT clogged,  ear pain, muffled hearing.)        Ear Fullness  Chronicity: Reports left ear fullness over the past 2 days, muffled hearing. The current episode started yesterday. The problem occurs constantly. The problem has been unchanged. Pertinent negatives include no chills, coughing, fever, myalgias, nausea, rash, sore throat or vomiting. Exacerbated by: Recent low pressure hemispheric changes. The treatment provided mild relief.   PMH:  has no past medical history of Anginal syndrome ([facility]), Arrhythmia, Arthritis, Asthma, At risk for sleep apnea, Back pain, Blood clotting disorder ([facility]), Bronchitis, Cancer ([facility]), Cataract, Congestive heart failure ([facility]), COPD (chronic obstructive pulmonary disease) ([facility]), Dialysis patient ([facility]), Disorder of thyroid, Fall, Glaucoma, Gynecological disorder, Heart murmur, Heart valve disease, Hemorrhagic disorder ([facility]), Hypertension, Indigestion, Infectious disease, Jaundice, Myocardial infarct ([facility]), Pacemaker, Pneumonia, Psychiatric problem, Renal disorder, Rheumatic fever, Seizure ([facility]), Stroke ([facility]), or Urinary incontinence.  MEDS:   Current Outpatient Medications:     fluticasone (FLONASE) 50 MCG/ACT nasal spray, 1 spray in each nostril, Disp: , Rfl:     docusate sodium 100 MG Cap, Take 100 mg by mouth 2 times a day as needed for Constipation. (Patient not taking: Reported on 2023), Disp: 60 Cap, Rfl:     ibuprofen (MOTRIN) 600 MG Tab, Take 1 Tab by mouth every 6 hours as needed for Mild Pain or Moderate Pain. (Patient not taking: Reported on 2023), Disp: 20 Tab, Rfl: 1    prenatal plus vitamin (STUARTNATAL 1+1) 27-1 MG Tab tablet, Take 1 Tab by mouth every morning. (Patient not taking: Reported on 2023), Disp: 30 Tab, Rfl:   ALLERGIES: No Known Allergies  SURGHX:   Past Surgical History:   Procedure Laterality Date    PRIMARY C SECTION N/A 2022    Procedure:   "SECTION, PRIMARY;  Surgeon: Joel Poole M.D.;  Location: SURGERY LABOR AND DELIVERY;  Service: Labor and Delivery    [place]IV[place]  10/12/2019    Procedure:  SECTION, PRIMARY;  Surgeon: Joel Poole M.D.;  Location: LABOR AND DELIVERY;  Service: Labor and Delivery    GYN SURGERY      OTHER      Rhinoplasty, [place] teeth      SOCHX:  reports that she has never smoked. She has never used smokeless tobacco. She reports that she does not currently use alcohol. She reports that she does not use drugs.  FH: History reviewed. No pertinent family history.  Review of Systems   Constitutional:  Negative for chills and fever.   HENT:  Negative for ear discharge and sore throat.    Respiratory:  Negative for cough and shortness of breath.    Gastrointestinal:  Negative for nausea and vomiting.   Musculoskeletal:  Negative for myalgias.   Skin:  Negative for rash.   Neurological:  Negative for dizziness.      Objective:   /72   Pulse 90   Temp 36.8 °C (98.2 °F) (Temporal)   Resp 16   Ht 1.651 m (5' 5\")   Wt 63.5 kg (140 lb)   SpO2 98%   Breastfeeding No   BMI 23.30 kg/m²   Physical Exam  Vitals and nursing note reviewed.   Constitutional:       General: She is not in acute distress.     Appearance: She is well-developed.   HENT:      Head: Normocephalic and atraumatic.      Right Ear: External ear normal. No decreased hearing noted. There is no impacted cerumen. Tympanic membrane is not erythematous or bulging.      Left Ear: External ear normal. No decreased hearing noted. There is impacted cerumen. Tympanic membrane is not erythematous or bulging.      Ears:      Comments: Left-sided impacted cerumen extracted via irrigation     Nose: Nose normal.      Mouth/Throat:      Mouth: Mucous membranes are moist.   Eyes:      Conjunctiva/sclera: Conjunctivae normal.   Cardiovascular:      Rate and Rhythm: Normal rate.   Pulmonary:      Effort: Pulmonary effort is normal. No respiratory " distress.      Breath sounds: Normal breath sounds.   Abdominal:      General: There is no distension.   Musculoskeletal:         General: Normal range of motion.   Skin:     General: Skin is warm and dry.   Neurological:      General: No focal deficit present.      Mental Status: She is alert and oriented to person, place, and time. Mental status is at baseline.      Gait: Gait (gait at baseline) normal.   Psychiatric:         Judgment: Judgment normal.         Assessment/Plan:   1. Hearing loss due to cerumen impaction, left  - Ear Irrigation ([facility] Only); Future    2. Impacted cerumen of left ear    Other orders  - fluticasone (FLONASE) 50 MCG/ACT nasal spray; 1 spray in each nostril        Medical Decision Making/Course:  In the course of preparing for this visit with review of the pertinent past medical history, recent and past clinic visits, current medications, and performing chart, immunization, medical history and medication reconciliation, and in the further course of obtaining the current history pertinent to the clinic visit today, performing an exam and evaluation, ordering and independently evaluating labs, tests  , and/or procedures including left ear canal irrigation which the patient tolerated well and verbalized improved hearing and resolution of symptoms, prescribing any recommended new medications as noted above, providing any pertinent counseling and education and recommending further coordination of care, at least  16 minutes of total time were spent during this encounter.      Discussed close monitoring, return precautions, and supportive measures of maintaining adequate fluid hydration and caloric intake, relative rest and symptom management as needed for pain and/or fever.    Differential diagnosis, natural history, supportive care, and indications for immediate follow-up discussed.     Advised the patient to follow-up with the primary care physician for recheck, reevaluation, and consideration  of further management.    Please note that this dictation was created using voice recognition software. I have worked with consultants from the vendor as well as technical experts from [facility] to optimize the interface. I have made every reasonable attempt to correct obvious errors, but I expect that there are errors of grammar and possibly content that I did not discover before finalizing the note.

## 2023-09-06 ENCOUNTER — OFFICE VISIT (OUTPATIENT)
Dept: URGENT CARE | Facility: CLINIC | Age: 33
End: 2023-09-06
Payer: COMMERCIAL

## 2023-09-06 VITALS
RESPIRATION RATE: 18 BRPM | DIASTOLIC BLOOD PRESSURE: 58 MMHG | BODY MASS INDEX: 21.99 KG/M2 | TEMPERATURE: 97.6 F | WEIGHT: 132 LBS | HEART RATE: 94 BPM | OXYGEN SATURATION: 100 % | HEIGHT: 65 IN | SYSTOLIC BLOOD PRESSURE: 96 MMHG

## 2023-09-06 DIAGNOSIS — J02.9 PHARYNGITIS, UNSPECIFIED ETIOLOGY: ICD-10-CM

## 2023-09-06 DIAGNOSIS — J06.9 URI WITH COUGH AND CONGESTION: ICD-10-CM

## 2023-09-06 LAB
FLUAV RNA SPEC QL NAA+PROBE: NEGATIVE
FLUBV RNA SPEC QL NAA+PROBE: NEGATIVE
RSV RNA SPEC QL NAA+PROBE: NEGATIVE
SARS-COV-2 RNA RESP QL NAA+PROBE: NEGATIVE

## 2023-09-06 PROCEDURE — 0241U POCT CEPHEID COV-2, FLU A/B, RSV - PCR: CPT

## 2023-09-06 PROCEDURE — 99213 OFFICE O/P EST LOW 20 MIN: CPT | Mod: 25

## 2023-09-06 PROCEDURE — 3078F DIAST BP <80 MM HG: CPT

## 2023-09-06 PROCEDURE — 3074F SYST BP LT 130 MM HG: CPT

## 2023-09-06 RX ORDER — ACETAMINOPHEN, DEXTROMETHORPHAN HYDROBROMIDE, AND TRIPROLIDINE HYDROCHLORIDE 325; 10; 1.25 MG/1; MG/1; MG/1
TABLET, COATED ORAL
COMMUNITY

## 2023-09-06 RX ORDER — DEXAMETHASONE SODIUM PHOSPHATE 4 MG/ML
2 INJECTION, SOLUTION INTRA-ARTICULAR; INTRALESIONAL; INTRAMUSCULAR; INTRAVENOUS; SOFT TISSUE ONCE
Status: DISCONTINUED | OUTPATIENT
Start: 2023-09-06 | End: 2023-09-06

## 2023-09-06 RX ORDER — BENZONATATE 100 MG/1
100 CAPSULE ORAL 3 TIMES DAILY PRN
Qty: 60 CAPSULE | Refills: 0 | Status: SHIPPED | OUTPATIENT
Start: 2023-09-06 | End: 2023-09-06

## 2023-09-06 RX ORDER — DEXTROMETHORPHAN HYDROBROMIDE AND PROMETHAZINE HYDROCHLORIDE 15; 6.25 MG/5ML; MG/5ML
5 SYRUP ORAL NIGHTLY PRN
Qty: 118 ML | Refills: 0 | Status: SHIPPED | OUTPATIENT
Start: 2023-09-06 | End: 2023-09-13

## 2023-09-06 RX ORDER — DEXAMETHASONE SODIUM PHOSPHATE 4 MG/ML
4 INJECTION, SOLUTION INTRA-ARTICULAR; INTRALESIONAL; INTRAMUSCULAR; INTRAVENOUS; SOFT TISSUE ONCE
Status: DISCONTINUED | OUTPATIENT
Start: 2023-09-06 | End: 2023-09-06

## 2023-09-06 RX ORDER — BENZONATATE 100 MG/1
100 CAPSULE ORAL 3 TIMES DAILY PRN
Qty: 20 CAPSULE | Refills: 0 | Status: SHIPPED | OUTPATIENT
Start: 2023-09-06

## 2023-09-06 RX ORDER — DEXAMETHASONE SODIUM PHOSPHATE 4 MG/ML
10 INJECTION, SOLUTION INTRA-ARTICULAR; INTRALESIONAL; INTRAMUSCULAR; INTRAVENOUS; SOFT TISSUE ONCE
Status: COMPLETED | OUTPATIENT
Start: 2023-09-06 | End: 2023-09-06

## 2023-09-06 RX ADMIN — DEXAMETHASONE SODIUM PHOSPHATE 10 MG: 4 INJECTION, SOLUTION INTRA-ARTICULAR; INTRALESIONAL; INTRAMUSCULAR; INTRAVENOUS; SOFT TISSUE at 15:00

## 2023-09-06 ASSESSMENT — ENCOUNTER SYMPTOMS
SORE THROAT: 0
SINUS PAIN: 0
SPUTUM PRODUCTION: 0
SHORTNESS OF BREATH: 0
WHEEZING: 0
HEMOPTYSIS: 0
HOARSE VOICE: 1
STRIDOR: 0
HEADACHES: 1
COUGH: 1
TROUBLE SWALLOWING: 0

## 2023-09-06 NOTE — PROGRESS NOTES
Subjective:   Narendra Capone is a very pleasant 33 y.o. female who presents for:    Chief Complaint   Patient presents with    Laryngitis     Started with a cough, sore throat, tonsil pain, Lt ear mainly,ear pressure/clogged, feels pain as she swallow x 1 week, loosing her voice; sore throat in the morning        HPI:    Pharyngitis   This is a new problem. The current episode started yesterday. There has been no fever. Associated symptoms include congestion, coughing, ear pain, headaches, a hoarse voice and a plugged ear sensation. Pertinent negatives include no drooling, ear discharge, shortness of breath, stridor or trouble swallowing. She has had no exposure to strep or mono. Treatments tried: salt water gargles, DayQuil, NyQuil, Mucinex. The treatment provided mild relief.     ROS:    Review of Systems   HENT:  Positive for congestion, ear pain and hoarse voice. Negative for drooling, ear discharge, sinus pain, sore throat and trouble swallowing.    Respiratory:  Positive for cough. Negative for hemoptysis, sputum production, shortness of breath, wheezing and stridor.    Neurological:  Positive for headaches.   All other systems reviewed and are negative.      Medications:      Current Outpatient Medications   Medication Sig    Pseudoephedrine-APAP-DM (DAYQUIL MULTI-SYMPTOM COLD/FLU PO) Take  by mouth.    Naproxen Sodium (ALEVE PO) Take  by mouth.    DM-Acetaminophen-Triprolidine (MUCINEX NIGHT COLD/FLU MAX STR) -1.25 MG Tab Take  by mouth.    IUD'S IU by Intrauterine route.    fluticasone (FLONASE) 50 MCG/ACT nasal spray 1 spray in each nostril    docusate sodium 100 MG Cap Take 100 mg by mouth 2 times a day as needed for Constipation. (Patient not taking: Reported on 1/13/2023)    ibuprofen (MOTRIN) 600 MG Tab Take 1 Tab by mouth every 6 hours as needed for Mild Pain or Moderate Pain. (Patient not taking: Reported on 1/13/2023)    prenatal plus vitamin (STUARTNATAL 1+1) 27-1 MG Tab tablet Take 1 Tab by  mouth every morning. (Patient not taking: Reported on 2023)       Allergies:     No Known Allergies    Problem List:     Patient Active Problem List   Diagnosis    Labor and delivery indication for care or intervention    Oral hypoglycemic controlled White classification A2 gestational diabetes mellitus (GDM)    SGA (small for gestational age)    Fetal intolerance to labor, delivered, current hospitalization    Labor and delivery, indication for care       Surgical History:    Past Surgical History:   Procedure Laterality Date    PRIMARY C SECTION N/A 2022    Procedure:  SECTION, PRIMARY;  Surgeon: oJel Poole M.D.;  Location: SURGERY LABOR AND DELIVERY;  Service: Labor and Delivery    [place]  DELIVERY ONLY  10/12/2019    Procedure:  SECTION, PRIMARY;  Surgeon: Joel Poole M.D.;  Location: LABOR AND DELIVERY;  Service: Labor and Delivery    GYN SURGERY      OTHER      Rhinoplasty, [place] teeth        Past Social Hx:     Social History     Socioeconomic History    Marital status:    Tobacco Use    Smoking status: Never    Smokeless tobacco: Never   Vaping Use    Vaping Use: Never used   Substance and Sexual Activity    Alcohol use: Not Currently     Comment: occass    Drug use: Never    Sexual activity: Yes     Partners: Male        Past Family Hx:      History reviewed. No pertinent family history.    Problem list, medications, and allergies reviewed by myself today in Epic.     Objective:     Vitals:    23 1415   BP: 96/58   Pulse: 94   Resp: 18   Temp: 36.4 °C (97.6 °F)   SpO2: 100%       Physical Exam  Vitals reviewed.   Constitutional:       General: She is not in acute distress.     Appearance: Normal appearance. She is not ill-appearing, toxic-appearing or diaphoretic.   HENT:      Head: Normocephalic and atraumatic.      Right Ear: Tympanic membrane, ear canal and external ear normal.      Left Ear: Tympanic membrane, ear canal and external ear normal.       Nose: Nose normal.      Mouth/Throat:      Mouth: Mucous membranes are moist.      Pharynx: Oropharynx is clear.   Eyes:      Extraocular Movements: Extraocular movements intact.      Conjunctiva/sclera: Conjunctivae normal.      Pupils: Pupils are equal, round, and reactive to light.   Cardiovascular:      Rate and Rhythm: Normal rate and regular rhythm.      Pulses: Normal pulses.      Heart sounds: Normal heart sounds. No murmur heard.     No friction rub. No gallop.   Pulmonary:      Effort: Pulmonary effort is normal.      Breath sounds: Normal breath sounds.   Abdominal:      General: Abdomen is flat.      Palpations: Abdomen is soft.   Musculoskeletal:         General: Normal range of motion.      Cervical back: Normal range of motion.   Skin:     General: Skin is warm and dry.   Neurological:      General: No focal deficit present.      Mental Status: She is alert and oriented to person, place, and time.   Psychiatric:         Mood and Affect: Mood normal.         Behavior: Behavior normal.         Thought Content: Thought content normal.                   Assessment/Plan:     Diagnosis and associated orders:     1. Pharyngitis, unspecified etiology  - POCT CoV-2, Flu A/B, RSV by PCR  - dexamethasone (Decadron) injection 10 mg    2. URI with cough and congestion  - promethazine-dextromethorphan (PROMETHAZINE-DM) 6.25-15 MG/5ML syrup; Take 5 mL by mouth at bedtime as needed for Cough for up to 7 days.  Dispense: 118 mL; Refill: 0  - benzonatate (TESSALON) 100 MG Cap; Take 1 Capsule by mouth 3 times a day as needed for Cough for up to 20 doses.  Dispense: 20 Capsule; Refill: 0        Comments/MDM:     POCT COVID, flu, RSV negative in clinic  Patient declined strep testing at this time as it is not indicated  Per the Larson trial I will administer 10 mg of dexamethasone in clinic for pharyngitis.  I discussed the risks vs benefits as well as alternatives with the patient and using shared decision making we  have elected this as a treatment modality.     Prescriptions for benzonatate and promethazine-dextromethorphan sent to patient's preferred pharmacy for the symptomatic treatment of cough  For congestion, I recommended OTC medication containing  OTC second generation antihistamine daily (cetirizine, desloratadine, fexofenadine, levocetirizine, and loratadine) daily IN COMBINATION WITH:  OTC decongestant (Sudafed - Pseudoephedrine) unless contraindication in place, such as hypertension, CAD, narrow-angle glaucoma. Use with caution if the patient has a history of cardiac dysrhythmias, hyperthyroidism, DM, prostatic hypertrophy, and glaucoma should use with caution.  Intranasal fluticasone (Flonase) daily  Nasal saline rinses 2-3 times a day   May use short term nasal sprays, such as oxymetazoline (Afrin) to help relieve nasal discomfort, congestion, and/or pressure. Decongestant sprays should not be used longer than three consecutive days.   Breathe Right nasal strips at night for nasal congestion,  Ponaris nasal emmollient for nasal congestion, dryness, and inflammation (do not use with iodine sensitivity)  Cool mist humidification, chest rubs, warm tea with honey, increased fluid intake to thin secretions  Tylenol or ibuprofen as needed for fever control, body aches, and headaches.  Follow-up with PCP within the 7 days if symptoms do not improve with treatment  Return to clinic or go to the ED if symptoms worsen or fail to improve, or if the patient should develop worsening/increasing sinus pain/pressure, congestion, ear pain, sore throat, headache, cough, shortness of breath, wheezing, chest pain, fever/chills, and/or any concerning symptoms.           All questions answered. Patient verbalized understanding and is in agreement with this plan of care.     If symptoms are worsening or not improving in 3-5 days, follow-up with PCP or return to . Differential diagnosis, natural history, and supportive care discussed.  AVS handout given and reviewed with patient. Patient educated on red flags and when to seek treatment back in ED or UC.     I personally reviewed prior external notes and test results pertinent to today's visit.  I have independently reviewed and interpreted all diagnostics ordered during this urgent care visit.     This dictation has been created using voice recognition software. The accuracy of the dictation is limited by the abilities of the software. I expect there may be some errors of grammar and possibly content. I made every attempt to manually correct the errors within my dictation. However, errors related to voice recognition software may still exist and should be interpreted within the appropriate context.    This note was electronically signed by FREDDIE Escobedo

## 2023-09-06 NOTE — PATIENT INSTRUCTIONS
"As we discussed your clinical condition would benefit from over-the-counter remedies:    Guaifenesin: MUCINEX  As needed for mucous (expectorant)    FLONASE (once per day)  You may consider intranasal steroids such as fluticasone (brand name Flonase), (other options include Nasonex, Rhinocort, Nasacort) daily; continue for at least 2-3 weeks. Any generic should work as well but may cause slightly more nasal irritation. Please take according to package directions.  This steroid will help reduce inflammation of your sinuses.  This is the number one medication to help with seasonal allergies and treat nasal inflammation.  Cost: around $8 at [facility] for the generic fluticasone [facility] product (as of 5/20).    ANTIHISTAMINES  You may take a non-sedating antihistamine like Zyrtec (cetirizine) , Allegra (fexofenadine), Xyzal (levocetirizine), or Claritin (loratadine).  This will help \"dry you out\" and reduce the amount of nasal inflammation.  Follow package directions paying attention to whether they are once or twice daily.  Note: if there is a \"D\" such patrick Allegra-D it also contains a decongestant such as sudafed.  Some patients benefit from alternating these medications every few weeks but literature does not support this.   Cost: around $11 at [employer] for the generic cetirizine [facility] branded product (as of 5/20)    SUDAFED (low dose to see if tolerated)  You may consider over-the-counter Sudafed (pseudoephedrine) to act as a decongestant to improve your breathing through your nose.  Please do not use this medication if you already have known high blood pressure.  Please take according to package directions and note that this has a stimulating effect and should not be taken late in the day.  There is a low dose 12 hour formulation and a higher dose 24 hour formulation.  Start with a low dose to make sure you tolerate the medication.  Do not take late in the day as it may interfere with sleep.   Cost: Around $6 for the " "generic [employer] branded product at a 10mg dose (as of 2/2023).      BENZOCAINE DROPS  These contain the active ingredient benzocaine which is an anesthetic agent.  It helps relieve the symptoms of sore throat and may diminish your cough reflex.  Take according to package directions.  The brand name CEPACOL but the generic will suffice.  Please note that taking too frequently may cause harm - pay attention to package directions.  Cost: around $4 at [employer] for Chloroseptic drops (15 count) (as of 2/2023).      SALINE IRRIGATION/SPRAY  You may consider using a saline nasal irrigation (such as a \"Neti pot\" or similar device using sterile water, NOT tap water) or OTC saline nasal spray      NSAIDs  You may take Ibuprofen (brand name Motrin or Advil) may be taken 800 mg up to three times per day taken with food (do not exceed 2400 mg per day).  If you prefer you may consider Naproxen (brand name Naprosyn or Aleve) around 500 mg twice per day with food (do not exceed 1200 mg per day). Please do not take if you have known stomach ulcers or known kidney disease.     TYLENOL  You may take Tylenol according to package directions (1000 mg every 8 hours not to exceed 3000 mg per day).  Please do not consume with any alcohol products, or if you have known or suspected liver disease. These will help reduce inflammation, help with pain control, and can reduce fevers.     "

## (undated) DEVICE — SUTURE 2-0 VICRYL PLUS CT-1 36 (36PK/BX)"

## (undated) DEVICE — STERI STRIP COMPOUND BENZOIN - TINCTURE 0.6ML WITH APPLICATOR (40EA/BX)

## (undated) DEVICE — WATER IRRIGATION STERILE 1000ML (12EA/CA)

## (undated) DEVICE — TRAY SPINAL ANESTHESIA NON-SAFETY (10/CA)

## (undated) DEVICE — SUTURE 3-0 VICRYL PLUS CT-1 - 36 INCH (36/BX)

## (undated) DEVICE — GLOVE BIOGEL SZ 8.5 SURGICAL PF LTX - (50PR/BX 4BX/CA)

## (undated) DEVICE — SET EXTENSION WITH 2 PORTS (48EA/CA) ***PART #2C8610 IS A SUBSTITUTE*****

## (undated) DEVICE — KIT  I.V. START (100EA/CA)

## (undated) DEVICE — SUTURE 0 36IN PDS + VIO CT-1 (36PK/BX)

## (undated) DEVICE — RETRACTOR O C SECTION LRY - (5/BX)

## (undated) DEVICE — WATER IRRIG. STER. 1500 ML - (9/CA)

## (undated) DEVICE — TUBING CLEARLINK DUO-VENT - C-FLO (48EA/CA)

## (undated) DEVICE — SLEEVE, SEQUENTIAL CALF REG

## (undated) DEVICE — SUTURE 0 VICRYL PLUS CT-1 - 36 INCH (36/BX)

## (undated) DEVICE — HEAD HOLDER JUNIOR/ADULT

## (undated) DEVICE — CLOSURE SKIN STRIP 1/2 X 4 IN - (STERI STRIP) (50/BX 4BX/CA)

## (undated) DEVICE — CATHETER IV NON-SAFETY 18 GA X 1 1/4 (50/BX 4BX/CA)

## (undated) DEVICE — TAPE CLOTH MEDIPORE 6 INCH - (12RL/CA)

## (undated) DEVICE — ELECTRODE DUAL RETURN W/ CORD - (50/PK)

## (undated) DEVICE — CHLORAPREP 26 ML APPLICATOR - ORANGE TINT(25/CA)

## (undated) DEVICE — PACK C-SECTION (2EA/CA)

## (undated) DEVICE — SHEATH RO 4F 10CM (10EA/BX)

## (undated) DEVICE — SUTURE 4-0 VICRYL PLUSFS-1 - 27 INCH (36/BX)

## (undated) DEVICE — SODIUM CHL IRRIGATION 0.9% 1000ML (12EA/CA)

## (undated) DEVICE — DRESSING POST OP BORDER 4 X 10 (5EA/BX)

## (undated) DEVICE — CANISTER SUCTION 3000ML MECHANICAL FILTER AUTO SHUTOFF MEDI-VAC NONSTERILE LF DISP  (40EA/CA)

## (undated) DEVICE — DETERGENT RENUZYME PLUS 10 OZ PACKET (50/BX)

## (undated) DEVICE — TRAY BLADDER CARE W/ 16 FR FOLEY CATHETER STATLOCK  (10/CA)

## (undated) DEVICE — TUBE SUCTION YANKAUER  1/4 X 6FT (20EA/CA)"

## (undated) DEVICE — BENZOIN TINCTURE AMPULE